# Patient Record
Sex: FEMALE | Race: WHITE
[De-identification: names, ages, dates, MRNs, and addresses within clinical notes are randomized per-mention and may not be internally consistent; named-entity substitution may affect disease eponyms.]

---

## 2020-09-15 ENCOUNTER — HOSPITAL ENCOUNTER (EMERGENCY)
Dept: HOSPITAL 76 - ED | Age: 55
Discharge: HOME | End: 2020-09-15
Payer: COMMERCIAL

## 2020-09-15 VITALS — SYSTOLIC BLOOD PRESSURE: 136 MMHG | DIASTOLIC BLOOD PRESSURE: 78 MMHG
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
## 2020-09-15 DIAGNOSIS — S92.211A: Primary | ICD-10-CM

## 2020-09-15 PROCEDURE — 99283 EMERGENCY DEPT VISIT LOW MDM: CPT

## 2020-09-15 PROCEDURE — 73630 X-RAY EXAM OF FOOT: CPT

## 2020-09-15 PROCEDURE — 73610 X-RAY EXAM OF ANKLE: CPT

## 2020-09-15 NOTE — ED PHYSICIAN DOCUMENTATION
History of Present Illness





- Stated complaint


Stated Complaint: R FOOT INJ





- Chief complaint


Chief Complaint: Trauma Ext





- History obtained from


History obtained from: Patient





- History of Present Illness


Timing: How many days ago (3)





- Additonal information


Additional information: 


55-year-old female presents to the emergency department with acute right foot 

and ankle pain.  Reports descending stairs 3 days ago missing the last 2 stairs 

and falling forward onto her foot.  She had immediate and extensive swelling of 

the foot and ankle both medially and bilaterally.  She has not been able to bear

any weight and has been using home crutches.  She has iced the foot continuously

now for over 36 hours secondary to pain and bruising. No history of previous 

injury to the foot though she is on steroids chronically due to a history of 

adrenal insufficiency








Review of Systems


Constitutional: reports: Reviewed and negative


Eyes: reports: Reviewed and negative


Ears: reports: Reviewed and negative


Nose: reports: Reviewed and negative


Cardiac: reports: Reviewed and negative


Respiratory: reports: Reviewed and negative


GI: reports: Reviewed and negative


: reports: Reviewed and negative


Skin: reports: Other (Dense of bruising to the right foot and ankle)


Musculoskeletal: reports: Extremity pain, Joint pain, Extremity swelling, Joint 

swelling


Neurologic: reports: Reviewed and negative


Psychiatric: reports: Reviewed and negative


Endocrine: reports: Other (adrenal insufficiency)





PD PAST MEDICAL HISTORY





- Past Medical History


Past Medical History: Yes


Cardiovascular: None


Respiratory: None


Neuro: None


Endocrine/Autoimmune: None


GI: None


GYN: None


: None


HEENT: None


Psych: None


Musculoskeletal: None


Derm: None





- Past Surgical History


Past Surgical History: No


Ortho: Rotator cuff repair


/GYN:  section





- Present Medications


Home Medications: 


                                Ambulatory Orders











 Medication  Instructions  Recorded  Confirmed


 


Hydrocodone/Acetaminophen [Norco 1 each PO BID PRN #15 tablet 09/15/20 





5-325 Tablet]   














- Allergies


Allergies/Adverse Reactions: 


                                    Allergies











Allergy/AdvReac Type Severity Reaction Status Date / Time


 


shellfish derived Allergy  Unknown Verified 09/15/20 13:01














- Social History


Does the pt smoke?: No


Smoking Status: Never smoker





- Immunizations


Immunizations are current?: Yes





- POLST


Patient has POLST: No





PD ED PE EXPANDED





- General


General: Alert, No acute distress, Well developed/nourished





- Extremities


Extremities: Right ankle (swelling lateral and medial malleolous;  normal dorsi 

and plant frlexion.  Full ROM of right ankle), Right foot (swelling and 

ecchymosis dorsum of right food over the 2,3,4,5th metatarsals.  tenderness with

palpation proximal 4/5th metatarsals)





Results





- Vitals


Vitals: 


                               Vital Signs - 24 hr











  09/15/20





  12:54


 


Temperature 36.6 C


 


Heart Rate 61


 


Respiratory 14





Rate 


 


Blood Pressure 108/73


 


O2 Saturation 99








                                     Oxygen











O2 Source                      Room air

















- Rads (name of study)


  ** right ankle/foot


Radiology: Final report received (Avulsion injury Involving the lateral aspect o

f the calcaneal cuboid joint with lateral soft tissue swelling)





Departure





- Departure


Disposition: 01 Home, Self Care


Clinical Impression: 


Ankle fracture, right


Qualifiers:


 Encounter type: initial encounter Fracture type: closed Qualified Code(s): 

S82.891A - Other fracture of right lower leg, initial encounter for closed 

fracture





Condition: Stable


Record reviewed to determine appropriate education?: Yes


Follow-Up: 


Sancho Chaidez MD [Provider Admit Priv/Credential] - 


Prescriptions: 


Hydrocodone/Acetaminophen [Norco 5-325 Tablet] 1 each PO BID PRN #15 tablet


 PRN Reason: Pain


Comments: 


The x-ray shows that you have an avulsion fracture of the calcaneal cuboid joint

on your right foot.  The splint that we have placed you in is temporary.  Please

call the orthopedics department to schedule follow-up tomorrow.  If at any point

you have numbness or tingling in the foot feel that the splint is too tight or 

develop fevers please return to the emergency department to be reevaluated.  

Continue to be nonweightbearing on the ankle until seen by orthopedics therefore

use your crutches at all times





I have prescribed a limited amount of Vicodin for severe pain do not drive if 

taking it.  I do recommend Tylenol or ibuprofen for pain control otherwise

## 2020-09-15 NOTE — XRAY REPORT
PROCEDURE:  Ankle 3 View RT

 

INDICATIONS:  pain; r/o fx

 

TECHNIQUE:  3 views of the ankle were acquired.  

 

COMPARISON:  None

 

FINDINGS:  

 

Bones: Suggestion of a remote injury involving lateral and dorsal aspect of calcaneocuboid joint is s
een. No other fracture or dislocation. Ankle mortise is normally aligned.  No suspicious bony lesions
.  

 

Soft tissues:  No tibiotalar joint effusion.  Achilles tendon appears normal.  Mild lateral ankle sof
t tissue swelling is seen.

 

IMPRESSION: Acute avulsion injury involving lateral and dorsal aspect of proximal cuboid adjacent to 
the calcaneocuboid joint with lateral ankle soft tissue swelling.

 

Reviewed by: Aaron Al MD on 9/15/2020 2:21 PM PDT

Approved by: Aaron Al MD on 9/15/2020 2:21 PM PDT

 

 

Station ID:  [id number]

## 2020-09-15 NOTE — XRAY REPORT
PROCEDURE:  Foot 3 View RT

 

INDICATIONS:  fall r/o fracture

 

TECHNIQUE:  3 views of the foot were acquired.  

 

COMPARISON:  None

 

FINDINGS:  

 

Bones: Subtle cortical irregularity involving lateral aspect of anterior calcaneus/proximal cuboid janeth
ne is seen suggestive of avulsion injury in this area. Mild lateral ankle and hindfoot soft tissue sw
elling is also seen. No suspicious bony lesions.  

 

Soft tissues:  No tibiotalar joint effusion.  Achilles tendon appears normal.  

 

IMPRESSION:  

Suggestion of acute avulsion injury involving lateral aspect of calcaneocuboid joint with lateral sof
t tissues swelling.

 

Reviewed by: Aaron Al MD on 9/15/2020 2:22 PM PDT

Approved by: Aaron Al MD on 9/15/2020 2:22 PM PDT

 

 

Station ID:  [id number]

## 2020-11-06 ENCOUNTER — HOSPITAL ENCOUNTER (OUTPATIENT)
Dept: HOSPITAL 76 - DI.WCP | Age: 55
Discharge: HOME | End: 2020-11-06
Attending: PHYSICIAN ASSISTANT
Payer: COMMERCIAL

## 2020-11-06 DIAGNOSIS — S92.214D: Primary | ICD-10-CM

## 2020-11-06 NOTE — XRAY REPORT
PROCEDURE:  Foot 3 View RT

 

INDICATIONS:  RIGHT FOOT FRACTURE

 

TECHNIQUE:  3 nonweightbearing views of the foot were acquired.  

 

COMPARISON:  Right foot radiographs dated 9/15/2020

 

FINDINGS:  

 

Bones: Small osseous fragment is seen adjacent to lateral aspect of the cuboid at the level of the ca
lcaneocuboid articulation. Stable mild osseous irregularity at the dorsal aspect of the talar head. F
indings do not appear significantly changed when compared to the radiographs from 9/15/2020. No suspi
cious bony lesions.  

 

Soft tissues: Soft tissue edema is seen surrounding the ankle.

 

IMPRESSION:  

Tiny osseous fragment adjacent to the calcaneocuboid joint may represent an avulsion fragment, not si
gnificantly changed in appearance when compared to the radiographs from 9/15/2020. There is also inta
ct osseous irregularity at the dorsal aspect of the talar head.

 

Reviewed by: Nuno Gates MD on 11/6/2020 4:18 PM PST

Approved by: Nuno Gates MD on 11/6/2020 4:18 PM PST

 

 

Station ID:  [id number]

## 2021-07-27 ENCOUNTER — HOSPITAL ENCOUNTER (OUTPATIENT)
Dept: HOSPITAL 76 - DI.N | Age: 56
Discharge: HOME | End: 2021-07-27
Attending: PHYSICIAN ASSISTANT
Payer: COMMERCIAL

## 2021-07-27 DIAGNOSIS — M79.671: Primary | ICD-10-CM

## 2021-07-27 NOTE — XRAY REPORT
PROCEDURE:  Foot 3 View RT

 

INDICATIONS:  R FOOT PX

 

TECHNIQUE:  3 views of the right foot were acquired.  

 

COMPARISON:  11/6/2020

 

FINDINGS:  

 

Bones:  No fractures or dislocations. Small osseous fragment adjacent to the lateral margin of the cu
boid bone is identified 11/6/2020 is not seen on the current study. No suspicious bony lesions.  

 

Soft tissues:  No tibiotalar joint effusion.  Achilles tendon appears normal.  

 

 

IMPRESSION:  

 

No fracture. No osseous lesion. If there are persistent symptoms or continued clinical concern for pa
thology, then repeat plain film radiographs (7-10 days) or advanced imaging (CT, MR, bone scan) shoul
d be considered for further evaluation.

 

Reviewed by: Sahra Jones MD, PhD on 7/27/2021 5:01 PM PDT

Approved by: Sahra Jones MD, PhD on 7/27/2021 5:01 PM PDT

 

 

Station ID:  [id number]

## 2021-08-26 ENCOUNTER — HOSPITAL ENCOUNTER (OUTPATIENT)
Dept: HOSPITAL 76 - LAB.S | Age: 56
Discharge: HOME | End: 2021-08-26
Attending: INTERNAL MEDICINE
Payer: COMMERCIAL

## 2021-08-26 DIAGNOSIS — E27.49: Primary | ICD-10-CM

## 2021-08-26 LAB
ALBUMIN DIAFP-MCNC: 3.8 G/DL (ref 3.2–5.5)
ALBUMIN/GLOB SERPL: 1.5 {RATIO} (ref 1–2.2)
ALP SERPL-CCNC: 49 IU/L (ref 42–121)
ALT SERPL W P-5'-P-CCNC: 21 IU/L (ref 10–60)
ANION GAP SERPL CALCULATED.4IONS-SCNC: 6 MMOL/L (ref 6–13)
AST SERPL W P-5'-P-CCNC: 17 IU/L (ref 10–42)
BILIRUB BLD-MCNC: 0.5 MG/DL (ref 0.2–1)
BUN SERPL-MCNC: 29 MG/DL (ref 6–20)
CALCIUM UR-MCNC: 9.4 MG/DL (ref 8.5–10.3)
CHLORIDE SERPL-SCNC: 103 MMOL/L (ref 101–111)
CO2 SERPL-SCNC: 30 MMOL/L (ref 21–32)
CREAT SERPLBLD-SCNC: 0.6 MG/DL (ref 0.4–1)
GFRSERPLBLD MDRD-ARVRAT: 103 ML/MIN/{1.73_M2} (ref 89–?)
GLOBULIN SER-MCNC: 2.5 G/DL (ref 2.1–4.2)
GLUCOSE SERPL-MCNC: 85 MG/DL (ref 70–100)
POTASSIUM SERPL-SCNC: 4.7 MMOL/L (ref 3.5–5)
PROT SPEC-MCNC: 6.3 G/DL (ref 6.7–8.2)
SODIUM SERPLBLD-SCNC: 139 MMOL/L (ref 135–145)

## 2021-08-26 PROCEDURE — 36415 COLL VENOUS BLD VENIPUNCTURE: CPT

## 2021-08-26 PROCEDURE — 80053 COMPREHEN METABOLIC PANEL: CPT

## 2021-10-20 ENCOUNTER — HOSPITAL ENCOUNTER (EMERGENCY)
Dept: HOSPITAL 76 - ED | Age: 56
Discharge: HOME | End: 2021-10-20
Payer: COMMERCIAL

## 2021-10-20 VITALS — SYSTOLIC BLOOD PRESSURE: 116 MMHG | DIASTOLIC BLOOD PRESSURE: 70 MMHG

## 2021-10-20 DIAGNOSIS — S81.812A: Primary | ICD-10-CM
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
## 2021-10-20 PROCEDURE — 73590 X-RAY EXAM OF LOWER LEG: CPT

## 2021-10-20 PROCEDURE — 12004 RPR S/N/AX/GEN/TRK7.6-12.5CM: CPT

## 2021-10-20 PROCEDURE — 96372 THER/PROPH/DIAG INJ SC/IM: CPT

## 2021-10-20 PROCEDURE — 99283 EMERGENCY DEPT VISIT LOW MDM: CPT

## 2021-10-20 NOTE — XRAY REPORT
PROCEDURE:  Tib/Fib LT

 

INDICATIONS:  struck left lower leg

 

TECHNIQUE:  2 views of the tibia and fibula were acquired.  

 

COMPARISON:  None.

 

 

FINDINGS:

BONES: No acute, displaced fracture or dislocation.

 

SOFT TISSUES: Defect overlying the mid to distal fibula, which may reflect laceration. No radiopaque 
foreign body.

 

IMPRESSION:  

1.No acute osseous abnormality.

 

   

 

 

Reviewed by: Mike Rainey MD on 10/20/2021 9:10 AM PDT

Approved by: Mike Rainey MD on 10/20/2021 9:10 AM PDT

 

 

Station ID:  [id number]

## 2021-10-20 NOTE — ED PHYSICIAN DOCUMENTATION
PD HPI LOWER EXT INJURY





- Stated complaint


Stated Complaint: LT LEG INJURY





- Chief complaint


Chief Complaint: Laceration





- History obtained from


History obtained from: Patient





- History of Present Illness


PD HPI LOW EXT INJURY LOCATION: Left


Type of injury: Fall (she was getting ready to go to airport for business trip 

and tripped over her suitcase getting out to car, with striking shin as fell. 

Was wearing pants but got skin tear/lac through the clothing, from blunt edge. 

Large flap laceration front of left lower leg.)


Where injury occurred: Home


Timing - onset: How many hours ago (1), Today


Timing - details: Abrupt onset, Still present


Worsened by: Moving, Palpating, Other (she cleansed and apllied bandages to the 

laceration.)


Associated symptoms: No: Weakness, Numbness


Similar symptoms before: Has not had sx before, Other (is on chronic steroids 

due to Addisons disease and states skin bruises easily and slow healing.)


Recently seen: Not recently seen





Review of Systems


Constitutional: denies: Fever, Chills


Cardiac: denies: Chest pain / pressure


GI: denies: Abdominal Pain


Skin: reports: Laceration (s)


Neurologic: denies: Focal weakness, Numbness, Altered mental status, Headache





PD PAST MEDICAL HISTORY





- Past Medical History


Cardiovascular: None


Respiratory: None


Neuro: None


Endocrine/Autoimmune: None


GI: None


GYN: None


: None


HEENT: None


Psych: None


Musculoskeletal: None


Derm: None





- Past Surgical History


Past Surgical History: No


Ortho: Rotator cuff repair


/GYN:  section





- Present Medications


Home Medications: 


                                Ambulatory Orders











 Medication  Instructions  Recorded  Confirmed


 


Hydrocodone/Acetaminophen [Norco 1 each PO BID PRN #15 tablet 09/15/20 





5-325 Tablet]   


 


HYDROcod/ACETAM 5/325 [Norco 5/325] 1 ea PO Q6H PRN #18 tablet 10/20/21 


 


cephALEXin [Keflex] 500 mg PO TID #20 cap 10/20/21 














- Allergies


Allergies/Adverse Reactions: 


                                    Allergies











Allergy/AdvReac Type Severity Reaction Status Date / Time


 


shellfish derived Allergy  Unknown Verified 10/20/21 08:11














- Social History


Does the pt smoke?: No


Smoking Status: Never smoker





- Immunizations


Immunizations are current?: Yes





- POLST


Patient has POLST: No





PD ED PE NORMAL





- Vitals


Vital signs reviewed: Yes





- General


General: Alert and oriented X 3, Well developed/nourished, Other (appears in 

pain due to lower leg, and limping gait, but able to put full weight on leg. )





- Neck


Neck: Supple, no meningeal sign, No bony TTP





- Cardiac


Cardiac: RRR, No murmur





- Respiratory


Respiratory: Clear bilaterally





- Abdomen


Abdomen: Soft, Non tender





- Derm


Derm: Normal color, Warm and dry





- Extremities


Extremities: No deformity (tender anterior lower leg without deformity. FLap lac

 with length 10 cm. ), Other (left anterior lower leg has U-shaped flap lac with

 connected portion inferior. The upper portion has the tibia almost exposed with

 just some fatty layer just over it. The lacerated flap is full thickness skin 

so seem viable base, though the skin layer is thin. no FB. Minimal bleeding in 

base.  )





- Neuro


Neuro: Alert and oriented X 3, No motor deficit, No sensory deficit, Normal 

speech


Eye Opening: Spontaneous


Motor: Obeys Commands


Verbal: Oriented


GCS Score: 15





Results





- Vitals


Vitals: 


                               Vital Signs - 24 hr











  10/20/21





  08:08


 


Temperature 36.1 C L


 


Heart Rate 101 H


 


Respiratory 16





Rate 


 


Blood Pressure 116/70


 


O2 Saturation 100








                                     Oxygen











O2 Source                      Room air

















- Rads (name of study)


  ** left tib/fib


Radiology: Prelim report reviewed (no fractures nor osseous abnormality.), See 

rad report





Procedures





- Laceration (location)


  ** left anterior lower leg


Length in cm: 10 (skin at superior part is the thinnest, but still just full 

thickness. )


Wound type: Flap, Into subcut fat


Neurovascular status: Sensory intact, Motor intact, Vascular intact


Anesthesia: Lidocaine 1% with epi, Marcaine 0.5%


Wound preparation: Irrigated copiously NS, Wound explored, To the base, 

debridement of wound edges (traumatic laceration/avulsion)


Skin layer closure: Steri strips, Running, Size #-0 - enter number (4), Sutures 

- enter # (many)


Other: Patient tolerated well, No complications, Neurovascular intact, Dressing 

applied, Tetanus UTD





PD MEDICAL DECISION MAKING





- ED course


Complexity details: reviewed results (no fractures), considered differential 

(Large flap laceration to fatty tissue layer so likely viable tissue, though 

fragile skin due to steroids. Concern for slow healing, and suggest tight follow

 up with PMD and possible wound care clinic. ), d/w patient





Departure





- Departure


Disposition: 01 Home, Self Care


Clinical Impression: 


Laceration of lower leg


Qualifiers:


 Encounter type: initial encounter Laterality: left Qualified Code(s): S81.812A 

- Laceration without foreign body, left lower leg, initial encounter





Condition: Stable


Record reviewed to determine appropriate education?: Yes


Instructions:  ED Laceration All


Follow-Up: 


Tra Busch MD [Primary Care Provider] - 


Prescriptions: 


cephALEXin [Keflex] 500 mg PO TID #20 cap


HYDROcod/ACETAM 5/325 [Norco 5/325] 1 ea PO Q6H PRN #18 tablet


 PRN Reason: Pain


Comments: 


Keep the Steri-Strips and sutures clean and dry.  Change the dressing over them 

once or twice daily.  Allow the Steri-Strips to fall off on their own after 

hopefully week or so.





At that point you can start using ointment gently to the wound.  The sutures 

want to be removed in about 10 to 14 days to give a longer time for healing.





It would be good to have the wound checked again after several days (when back 

from your trip/early next week).  Contact your primary care and see if they will

see you or otherwise he can have the wound checked at the walk-in clinic on the 

[address] or back here in the ER.





Crutches for partial weight bearing to reduce the skin movement and tension 

initially while healing. Brief walking is okay (bathroom and back, etc).





This will take several weeks and healing.  Your primary care may want to refer 

you to the wound care clinic here at the hospital.  He would have to phone it in

all order to it in order for that to happen.





Alternatively the phone number to the wound care clinic is, and you can call 

them directly to see what is required in order to get follow-up there.





Cephalexin antibiotic 3 times a day for a week to reduce the chance of 

infection.  Tylenol every 4-6 hours if needed for pain or hydrocodone if needed 

for worse pain.





I phoned prescriptions to [facility] pharmacy in [place].





I am prescribing a short course of narcotic pain medication for you.  These are 

potentially dangerous and addictive medications that should be used carefully.


These medications may constipate you.  Take an over-the-counter stool softener 

such as docusate twice daily with plenty of water while taking these 

medications.  If you go 24 hours without a bowel movement, take over-the-counter

MiraLAX, per package instructions.


Do not drink or drive while taking these medications.


If you received narcotic or sedating medications while in the emergency 

department do not drive for 24 hours.  Store this medication in a safe, secure 

place and out of reach of children.


It is a violation of federal law to give or sell this medication to another 

person or to use in a manner other than prescribed.


The ED will not refill narcotic prescriptions, including prescriptions lost or 

stolen.  You can dispose of unwanted medications at the [facility]'s office 

or at several pharmacies such as [facility].


Discharge Date/Time: 10/20/21 10:45

## 2021-12-29 ENCOUNTER — HOSPITAL ENCOUNTER (OUTPATIENT)
Dept: HOSPITAL 76 - DI.S | Age: 56
Discharge: HOME | End: 2021-12-29
Attending: INTERNAL MEDICINE
Payer: COMMERCIAL

## 2021-12-29 DIAGNOSIS — L97.822: Primary | ICD-10-CM

## 2021-12-29 NOTE — XRAY REPORT
PROCEDURE:  Tib/Fib LT

 

INDICATIONS:  SKIN ULCER,LEFT PRETIBIAL REGION W/ FAT LAYER EXPO

 

TECHNIQUE:  2 views of the tibia and fibula were acquired.  

 

COMPARISON:  X-ray tib-fib 10/20/2021

 

FINDINGS:  

 

Bones:  No fractures or dislocations.  No suspicious bony lesions.  

 

Soft tissues:  No suspicious soft tissue calcifications or masses.  There is appearance of overlying 
bandage at the mid anterior tibial region. Slight irregularity of the skin surface is noted, partiall
y visualized by overlying bandage.

 

IMPRESSION:  

Slight irregularity of the skin surface likely corresponding to area of ulcer in the anterior tibial 
region. No underlying osseous abnormality.

 

Reviewed by: Karina Rincon MD on 12/29/2021 12:06 PM PST

Approved by: Karina Rincon MD on 12/29/2021 12:06 PM PST

 

 

Station ID:  [id number]

## 2022-07-02 ENCOUNTER — HOSPITAL ENCOUNTER (OUTPATIENT)
Dept: HOSPITAL 76 - EMS | Age: 57
Discharge: LEFT BEFORE DISCHARGE | End: 2022-07-02
Payer: COMMERCIAL

## 2022-07-02 DIAGNOSIS — R53.1: ICD-10-CM

## 2022-07-02 DIAGNOSIS — R25.1: Primary | ICD-10-CM

## 2022-07-02 DIAGNOSIS — E27.1: ICD-10-CM

## 2022-09-09 ENCOUNTER — HOSPITAL ENCOUNTER (OUTPATIENT)
Dept: HOSPITAL 76 - DI.S | Age: 57
Discharge: HOME | End: 2022-09-09
Attending: PHYSICIAN ASSISTANT
Payer: COMMERCIAL

## 2022-09-09 DIAGNOSIS — M25.532: Primary | ICD-10-CM

## 2022-09-09 NOTE — XRAY REPORT
PROCEDURE:  Wrist 4 View LT

 

INDICATIONS: LEFT WRIST PAIN

 

TECHNIQUE:  4 views of the wrist were acquired.  

 

COMPARISON:  None

 

FINDINGS:  

 

Bones:  No fractures or dislocations.  No suspicious bony lesions.  

 

Scaphoid view:  Scaphoid appears intact. Scapholunate interval is maintained.

 

Soft tissues:  No suspicious soft tissue calcifications.  

 

IMPRESSION:  

Left wrist without acute fracture or dislocation.

 

If there is continued clinical concern for pathology or occult fracture, consider follow-up imaging w
ith repeat radiographs in 10-14 days and possible advanced imaging (CT, MRI, bone scan) if symptoms p
ersist.

 

Reviewed by: Rick Byrd MD on 9/9/2022 9:33 PM PDT

Approved by: Rick Byrd MD on 9/9/2022 9:33 PM PDT

 

 

Station ID:  [id number]

## 2024-01-24 ENCOUNTER — APPOINTMENT (RX ONLY)
Dept: URBAN - METROPOLITAN AREA CLINIC 15 | Facility: CLINIC | Age: 59
Setting detail: DERMATOLOGY
End: 2024-01-24

## 2024-01-24 DIAGNOSIS — L57.8 OTHER SKIN CHANGES DUE TO CHRONIC EXPOSURE TO NONIONIZING RADIATION: ICD-10-CM

## 2024-01-24 DIAGNOSIS — Z71.89 OTHER SPECIFIED COUNSELING: ICD-10-CM

## 2024-01-24 DIAGNOSIS — D18.0 HEMANGIOMA: ICD-10-CM

## 2024-01-24 DIAGNOSIS — L82.1 OTHER SEBORRHEIC KERATOSIS: ICD-10-CM

## 2024-01-24 DIAGNOSIS — D22 MELANOCYTIC NEVI: ICD-10-CM

## 2024-01-24 DIAGNOSIS — L56.5 DISSEMINATED SUPERFICIAL ACTINIC POROKERATOSIS (DSAP): ICD-10-CM

## 2024-01-24 DIAGNOSIS — E27 OTHER DISORDERS OF ADRENAL GLAND: ICD-10-CM

## 2024-01-24 DIAGNOSIS — L81.4 OTHER MELANIN HYPERPIGMENTATION: ICD-10-CM

## 2024-01-24 DIAGNOSIS — I73.00 RAYNAUD'S SYNDROME WITHOUT GANGRENE: ICD-10-CM

## 2024-01-24 PROBLEM — D18.01 HEMANGIOMA OF SKIN AND SUBCUTANEOUS TISSUE: Status: ACTIVE | Noted: 2024-01-24

## 2024-01-24 PROBLEM — D22.71 MELANOCYTIC NEVI OF RIGHT LOWER LIMB, INCLUDING HIP: Status: ACTIVE | Noted: 2024-01-24

## 2024-01-24 PROBLEM — D22.62 MELANOCYTIC NEVI OF LEFT UPPER LIMB, INCLUDING SHOULDER: Status: ACTIVE | Noted: 2024-01-24

## 2024-01-24 PROBLEM — D22.72 MELANOCYTIC NEVI OF LEFT LOWER LIMB, INCLUDING HIP: Status: ACTIVE | Noted: 2024-01-24

## 2024-01-24 PROBLEM — E27.40 UNSPECIFIED ADRENOCORTICAL INSUFFICIENCY: Status: ACTIVE | Noted: 2024-01-24

## 2024-01-24 PROBLEM — D22.5 MELANOCYTIC NEVI OF TRUNK: Status: ACTIVE | Noted: 2024-01-24

## 2024-01-24 PROBLEM — D22.61 MELANOCYTIC NEVI OF RIGHT UPPER LIMB, INCLUDING SHOULDER: Status: ACTIVE | Noted: 2024-01-24

## 2024-01-24 PROCEDURE — 99203 OFFICE O/P NEW LOW 30 MIN: CPT

## 2024-01-24 PROCEDURE — ? ADDITIONAL NOTES

## 2024-01-24 PROCEDURE — ? COUNSELING

## 2024-01-24 ASSESSMENT — LOCATION SIMPLE DESCRIPTION DERM
LOCATION SIMPLE: RIGHT CHEEK
LOCATION SIMPLE: LEFT CALF
LOCATION SIMPLE: LEFT POSTERIOR THIGH
LOCATION SIMPLE: LEFT CHEEK
LOCATION SIMPLE: RIGHT FOREARM
LOCATION SIMPLE: RIGHT UPPER ARM
LOCATION SIMPLE: RIGHT CALF
LOCATION SIMPLE: LEFT MIDDLE FINGER
LOCATION SIMPLE: RIGHT LOWER BACK
LOCATION SIMPLE: RIGHT THIGH
LOCATION SIMPLE: LEFT UPPER BACK
LOCATION SIMPLE: RIGHT UPPER BACK
LOCATION SIMPLE: CHEST
LOCATION SIMPLE: LEFT FOREARM
LOCATION SIMPLE: LEFT UPPER ARM
LOCATION SIMPLE: LEFT PRETIBIAL REGION
LOCATION SIMPLE: RIGHT PRETIBIAL REGION
LOCATION SIMPLE: LEFT THIGH
LOCATION SIMPLE: RIGHT MIDDLE FINGER
LOCATION SIMPLE: RIGHT POSTERIOR THIGH

## 2024-01-24 ASSESSMENT — LOCATION DETAILED DESCRIPTION DERM
LOCATION DETAILED: LEFT INFERIOR UPPER BACK
LOCATION DETAILED: RIGHT SUPERIOR LATERAL MIDBACK
LOCATION DETAILED: RIGHT DISTAL POSTERIOR THIGH
LOCATION DETAILED: RIGHT VENTRAL PROXIMAL FOREARM
LOCATION DETAILED: LEFT ANTERIOR PROXIMAL THIGH
LOCATION DETAILED: LEFT PROXIMAL DORSAL MIDDLE FINGER
LOCATION DETAILED: LEFT VENTRAL DISTAL FOREARM
LOCATION DETAILED: RIGHT ANTERIOR PROXIMAL THIGH
LOCATION DETAILED: LEFT DISTAL PRETIBIAL REGION
LOCATION DETAILED: RIGHT CENTRAL MALAR CHEEK
LOCATION DETAILED: RIGHT PROXIMAL CALF
LOCATION DETAILED: LEFT CENTRAL MALAR CHEEK
LOCATION DETAILED: RIGHT MEDIAL INFERIOR CHEST
LOCATION DETAILED: RIGHT MID-UPPER BACK
LOCATION DETAILED: RIGHT DISTAL PRETIBIAL REGION
LOCATION DETAILED: RIGHT PROXIMAL DORSAL FOREARM
LOCATION DETAILED: LEFT ANTERIOR DISTAL UPPER ARM
LOCATION DETAILED: RIGHT VENTRAL DISTAL FOREARM
LOCATION DETAILED: RIGHT ANTERIOR DISTAL UPPER ARM
LOCATION DETAILED: LEFT PROXIMAL DORSAL FOREARM
LOCATION DETAILED: RIGHT PROXIMAL DORSAL MIDDLE FINGER
LOCATION DETAILED: LEFT DISTAL POSTERIOR THIGH
LOCATION DETAILED: LEFT PROXIMAL CALF

## 2024-01-24 ASSESSMENT — LOCATION ZONE DERM
LOCATION ZONE: TRUNK
LOCATION ZONE: FACE
LOCATION ZONE: FINGER
LOCATION ZONE: ARM
LOCATION ZONE: LEG

## 2024-01-24 NOTE — PROCEDURE: ADDITIONAL NOTES
Render Risk Assessment In Note?: no
Additional Notes: Recommended sleeping in cotton gloves with thick moisturizer
Detail Level: Simple
Additional Notes: Pt has compound from [facility] [employer]; has statin/cholesterol compound crm. she uses it bid prn\\nOffered refill if needed
Additional Notes: Pt complains of frequent injury\\nRecommended keeping lesions moist and using covering
Additional Notes: Pt asked about getting collagen injections, gave okay from a health standpoint

## 2024-01-25 ENCOUNTER — RX ONLY (OUTPATIENT)
Age: 59
Setting detail: RX ONLY
End: 2024-01-25

## 2024-05-17 ENCOUNTER — HOSPITAL ENCOUNTER (OUTPATIENT)
Dept: RADIOLOGY | Facility: MEDICAL CENTER | Age: 59
End: 2024-05-17
Attending: FAMILY MEDICINE
Payer: COMMERCIAL

## 2024-05-17 DIAGNOSIS — M79.661 PAIN IN RIGHT SHIN: ICD-10-CM

## 2024-05-23 ENCOUNTER — APPOINTMENT (RX ONLY)
Dept: URBAN - METROPOLITAN AREA CLINIC 15 | Facility: CLINIC | Age: 59
Setting detail: DERMATOLOGY
End: 2024-05-23

## 2024-05-23 DIAGNOSIS — Z41.9 ENCOUNTER FOR PROCEDURE FOR PURPOSES OTHER THAN REMEDYING HEALTH STATE, UNSPECIFIED: ICD-10-CM

## 2024-05-23 PROCEDURE — ? BOTOX

## 2024-05-23 NOTE — HPI: COSMETIC (BOTOX)
Have You Had Botox Before?: has had botox
When Was Your Last Botox Treatment?: November 2023 in [place]

## 2024-05-23 NOTE — PROCEDURE: BOTOX
Lcl Root Units: 0
Show Lcl Units: No
Masseter Units: 20
Show Levator Superior Units: Yes
Post-Care Instructions: Patient instructed to not lie down for 4 hours and limit physical activity for 24 hours.
Reconstitution Date (Optional): 5/23/24
Additional Area 1 Location: [place]
Incrementing Botox Units: By 0.5 Units
Comments: Pt tolerated well,no issues. She will call for 3 mo Botox visit.
Nasal Root Units: 2
Expiration Date (Month Year): 09/2026
Detail Level: Detailed
Price (Use Numbers Only, No Special Characters Or $): 144
Lot #: [id number]
Forehead Units: 8
Dilution (U/0.1 Cc): 5
Consent: Written consent obtained. Risks include but not limited to lid/brow ptosis, bruising, swelling, diplopia, temporary effect, incomplete chemical denervation.

## 2024-06-20 ENCOUNTER — APPOINTMENT (RX ONLY)
Dept: URBAN - METROPOLITAN AREA CLINIC 15 | Facility: CLINIC | Age: 59
Setting detail: DERMATOLOGY
End: 2024-06-20

## 2024-06-20 DIAGNOSIS — Z41.9 ENCOUNTER FOR PROCEDURE FOR PURPOSES OTHER THAN REMEDYING HEALTH STATE, UNSPECIFIED: ICD-10-CM

## 2024-06-20 PROCEDURE — ? SKINVIVE INJECTION

## 2024-06-20 NOTE — PROCEDURE: SKINVIVE INJECTION
Map Statment: See Attach Map for Complete Details
Marionette Lines Filler Volume In Cc: 0
Number Of Syringes (Required For Inventory): 1
Price (Use Numbers Only, No Special Characters Or $): 548
Post-Care Instructions: Patient instructed to apply ice to reduce swelling.
Include Cannula Information In Note?: No
Consent: Written consent obtained. Risks include but not limited to bruising, beading, irregular texture, ulceration, infection, allergic reaction, scar formation, incomplete augmentation, temporary nature, procedural pain.
Expiration Date (Month Year): 8/19/24
Filler: Skinvive
Procedural Text: The filler was administered to the treatment areas noted above.
Detail Level: Detailed
Topical Anesthesia?: 15% lidocaine, 5% prilocaine, 0.25% phenenylephrine
Lot #: [id number]

## 2024-10-09 ENCOUNTER — APPOINTMENT (OUTPATIENT)
Dept: RADIOLOGY | Facility: MEDICAL CENTER | Age: 59
End: 2024-10-09
Attending: INTERNAL MEDICINE
Payer: COMMERCIAL

## 2024-10-17 ENCOUNTER — HOSPITAL ENCOUNTER (OUTPATIENT)
Dept: RADIOLOGY | Facility: MEDICAL CENTER | Age: 59
End: 2024-10-17
Payer: COMMERCIAL

## 2024-10-18 ENCOUNTER — HOSPITAL ENCOUNTER (OUTPATIENT)
Dept: RADIOLOGY | Facility: MEDICAL CENTER | Age: 59
End: 2024-10-18
Payer: COMMERCIAL

## 2024-10-29 ENCOUNTER — HOSPITAL ENCOUNTER (OUTPATIENT)
Dept: RADIOLOGY | Facility: MEDICAL CENTER | Age: 59
End: 2024-10-29
Attending: PHYSICAL MEDICINE & REHABILITATION
Payer: COMMERCIAL

## 2024-10-29 DIAGNOSIS — M47.816 LUMBAR SPONDYLOSIS: ICD-10-CM

## 2024-10-29 PROCEDURE — 72148 MRI LUMBAR SPINE W/O DYE: CPT

## 2024-11-07 ENCOUNTER — APPOINTMENT (RX ONLY)
Dept: URBAN - METROPOLITAN AREA CLINIC 15 | Facility: CLINIC | Age: 59
Setting detail: DERMATOLOGY
End: 2024-11-07

## 2024-11-07 DIAGNOSIS — Z41.9 ENCOUNTER FOR PROCEDURE FOR PURPOSES OTHER THAN REMEDYING HEALTH STATE, UNSPECIFIED: ICD-10-CM

## 2024-11-07 PROCEDURE — ? BOTOX

## 2024-11-07 NOTE — PROCEDURE: BOTOX
Additional Area 3 Units: 0
Show Additional Area 1: Yes
Show Right And Left Pupillary Line Units: No
Price (Use Numbers Only, No Special Characters Or $): 048
Detail Level: Detailed
Lot #: [id number]
Dilution (U/0.1 Cc): 5
Forehead Units: 8
Consent: Written consent obtained. Risks include but not limited to lid/brow ptosis, bruising, swelling, diplopia, temporary effect, incomplete chemical denervation.
Glabellar Complex Units: 15
Reconstitution Date (Optional): 11/7/24
Comments: Pt tolerated well,no issues. She will call for 3 mo Botox visit.
Post-Care Instructions: Patient instructed to not lie down for 4 hours and limit physical activity for 24 hours.
Periorbital Skin Units: 25
Nasal Root Units: 2
Expiration Date (Month Year): 09/2026
Incrementing Botox Units: By 0.5 Units

## 2024-11-13 ENCOUNTER — APPOINTMENT (OUTPATIENT)
Dept: RADIOLOGY | Facility: MEDICAL CENTER | Age: 59
End: 2024-11-13
Attending: INTERNAL MEDICINE
Payer: COMMERCIAL

## 2024-12-10 ENCOUNTER — APPOINTMENT (OUTPATIENT)
Dept: RADIOLOGY | Facility: MEDICAL CENTER | Age: 59
End: 2024-12-10
Attending: INTERNAL MEDICINE
Payer: COMMERCIAL

## 2024-12-10 DIAGNOSIS — Z12.31 VISIT FOR SCREENING MAMMOGRAM: ICD-10-CM

## 2024-12-10 PROCEDURE — 77067 SCR MAMMO BI INCL CAD: CPT

## 2024-12-12 ENCOUNTER — APPOINTMENT (OUTPATIENT)
Dept: URBAN - METROPOLITAN AREA CLINIC 15 | Facility: CLINIC | Age: 59
Setting detail: DERMATOLOGY
End: 2024-12-12

## 2024-12-12 DIAGNOSIS — Z41.9 ENCOUNTER FOR PROCEDURE FOR PURPOSES OTHER THAN REMEDYING HEALTH STATE, UNSPECIFIED: ICD-10-CM

## 2024-12-12 PROCEDURE — ? SKINVIVE INJECTION

## 2024-12-12 NOTE — PROCEDURE: SKINVIVE INJECTION
Lateral Face Filler Volume In Cc: 0
Consent: Written consent obtained. Risks include but not limited to bruising, beading, irregular texture, ulceration, infection, allergic reaction, scar formation, incomplete augmentation, temporary nature, procedural pain.
Expiration Date (Month Year): 8/19/25
Include Cannula Information In Note?: No
Detail Level: Detailed
Procedural Text: The filler was administered to the treatment areas noted above.
Price (Use Numbers Only, No Special Characters Or $): 526
Map Statment: See Attach Map for Complete Details
Lot #: [id number]
Filler: Skinvive
Number Of Syringes (Required For Inventory): 1
Topical Anesthesia?: 15% lidocaine, 5% prilocaine, 0.25% phenenylephrine
Post-Care Instructions: Patient instructed to apply ice to reduce swelling.

## 2024-12-15 ENCOUNTER — APPOINTMENT (OUTPATIENT)
Dept: RADIOLOGY | Facility: MEDICAL CENTER | Age: 59
End: 2024-12-15
Attending: FAMILY MEDICINE
Payer: COMMERCIAL

## 2025-01-20 ENCOUNTER — APPOINTMENT (OUTPATIENT)
Dept: RADIOLOGY | Facility: MEDICAL CENTER | Age: 60
End: 2025-01-20
Attending: FAMILY MEDICINE
Payer: COMMERCIAL

## 2025-02-06 ENCOUNTER — APPOINTMENT (OUTPATIENT)
Dept: URBAN - METROPOLITAN AREA CLINIC 15 | Facility: CLINIC | Age: 60
Setting detail: DERMATOLOGY
End: 2025-02-06

## 2025-02-06 DIAGNOSIS — Z41.9 ENCOUNTER FOR PROCEDURE FOR PURPOSES OTHER THAN REMEDYING HEALTH STATE, UNSPECIFIED: ICD-10-CM

## 2025-02-06 PROCEDURE — ? BOTOX

## 2025-02-06 NOTE — PROCEDURE: BOTOX
Additional Area 3 Units: 0
Show Additional Area 1: Yes
Show Right And Left Pupillary Line Units: No
Price (Use Numbers Only, No Special Characters Or $): 550
Detail Level: Detailed
Lot #: [id number]
Dilution (U/0.1 Cc): 5
Forehead Units: 8
Consent: Written consent obtained. Risks include but not limited to lid/brow ptosis, bruising, swelling, diplopia, temporary effect, incomplete chemical denervation.
Glabellar Complex Units: 15
Reconstitution Date (Optional): 2/5/25
Depressor Anguli Oris Units: 3
Comments: Pt tolerated well,no issues. She will call for 3 mo Botox visit.
Post-Care Instructions: Patient instructed to not lie down for 4 hours and limit physical activity for 24 hours.
Periorbital Skin Units: 22
Nasal Root Units: 2
Expiration Date (Month Year): 09/2026
Incrementing Botox Units: By 0.5 Units

## 2025-02-20 ENCOUNTER — APPOINTMENT (OUTPATIENT)
Dept: RADIOLOGY | Facility: MEDICAL CENTER | Age: 60
End: 2025-02-20
Attending: PHYSICIAN ASSISTANT
Payer: COMMERCIAL

## 2025-02-20 DIAGNOSIS — M25.511 PAIN OF RIGHT SHOULDER REGION: ICD-10-CM

## 2025-02-20 PROCEDURE — 73221 MRI JOINT UPR EXTREM W/O DYE: CPT | Mod: RT

## 2025-02-23 ENCOUNTER — HOSPITAL ENCOUNTER (OUTPATIENT)
Dept: RADIOLOGY | Facility: MEDICAL CENTER | Age: 60
End: 2025-02-23
Attending: FAMILY MEDICINE
Payer: COMMERCIAL

## 2025-02-23 ENCOUNTER — APPOINTMENT (OUTPATIENT)
Dept: RADIOLOGY | Facility: MEDICAL CENTER | Age: 60
End: 2025-02-23
Attending: EMERGENCY MEDICINE
Payer: COMMERCIAL

## 2025-02-23 ENCOUNTER — HOSPITAL ENCOUNTER (EMERGENCY)
Facility: MEDICAL CENTER | Age: 60
End: 2025-02-23
Attending: EMERGENCY MEDICINE
Payer: COMMERCIAL

## 2025-02-23 VITALS
HEIGHT: 67 IN | OXYGEN SATURATION: 96 % | BODY MASS INDEX: 16.92 KG/M2 | TEMPERATURE: 98.3 F | WEIGHT: 107.81 LBS | DIASTOLIC BLOOD PRESSURE: 53 MMHG | HEART RATE: 65 BPM | SYSTOLIC BLOOD PRESSURE: 91 MMHG | RESPIRATION RATE: 20 BRPM

## 2025-02-23 DIAGNOSIS — N28.1 ACQUIRED CYST OF KIDNEY: ICD-10-CM
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
## 2025-02-23 DIAGNOSIS — S70.01XA CONTUSION OF RIGHT HIP, INITIAL ENCOUNTER: ICD-10-CM

## 2025-02-23 DIAGNOSIS — K76.89 FLOATING LIVER: ICD-10-CM

## 2025-02-23 DIAGNOSIS — Z86.39 HISTORY OF ADDISON'S DISEASE: ICD-10-CM

## 2025-02-23 LAB
ALBUMIN SERPL BCP-MCNC: 4.2 G/DL (ref 3.2–4.9)
ALBUMIN/GLOB SERPL: 2.2 G/DL
ALP SERPL-CCNC: 47 U/L (ref 30–99)
ALT SERPL-CCNC: 19 U/L (ref 2–50)
ANION GAP SERPL CALC-SCNC: 8 MMOL/L (ref 7–16)
APPEARANCE UR: CLEAR
AST SERPL-CCNC: 24 U/L (ref 12–45)
BASOPHILS # BLD AUTO: 0.4 % (ref 0–1.8)
BASOPHILS # BLD: 0.03 K/UL (ref 0–0.12)
BILIRUB SERPL-MCNC: 0.4 MG/DL (ref 0.1–1.5)
BILIRUB UR QL STRIP.AUTO: NEGATIVE
BUN SERPL-MCNC: 18 MG/DL (ref 8–22)
CALCIUM ALBUM COR SERPL-MCNC: 8.9 MG/DL (ref 8.5–10.5)
CALCIUM SERPL-MCNC: 9.1 MG/DL (ref 8.4–10.2)
CHLORIDE SERPL-SCNC: 105 MMOL/L (ref 96–112)
CO2 SERPL-SCNC: 26 MMOL/L (ref 20–33)
COLOR UR: YELLOW
CREAT SERPL-MCNC: 0.67 MG/DL (ref 0.5–1.4)
EKG IMPRESSION: NORMAL
EOSINOPHIL # BLD AUTO: 0.09 K/UL (ref 0–0.51)
EOSINOPHIL NFR BLD: 1.1 % (ref 0–6.9)
ERYTHROCYTE [DISTWIDTH] IN BLOOD BY AUTOMATED COUNT: 49.9 FL (ref 35.9–50)
GFR SERPLBLD CREATININE-BSD FMLA CKD-EPI: 100 ML/MIN/1.73 M 2
GLOBULIN SER CALC-MCNC: 1.9 G/DL (ref 1.9–3.5)
GLUCOSE SERPL-MCNC: 75 MG/DL (ref 65–99)
GLUCOSE UR STRIP.AUTO-MCNC: NEGATIVE MG/DL
HCT VFR BLD AUTO: 41 % (ref 37–47)
HGB BLD-MCNC: 14.1 G/DL (ref 12–16)
IMM GRANULOCYTES # BLD AUTO: 0.02 K/UL (ref 0–0.11)
IMM GRANULOCYTES NFR BLD AUTO: 0.3 % (ref 0–0.9)
KETONES UR STRIP.AUTO-MCNC: NEGATIVE MG/DL
LEUKOCYTE ESTERASE UR QL STRIP.AUTO: NEGATIVE
LYMPHOCYTES # BLD AUTO: 3.11 K/UL (ref 1–4.8)
LYMPHOCYTES NFR BLD: 39.6 % (ref 22–41)
MCH RBC QN AUTO: 34.5 PG (ref 27–33)
MCHC RBC AUTO-ENTMCNC: 34.4 G/DL (ref 32.2–35.5)
MCV RBC AUTO: 100.2 FL (ref 81.4–97.8)
MICRO URNS: NORMAL
MONOCYTES # BLD AUTO: 0.6 K/UL (ref 0–0.85)
MONOCYTES NFR BLD AUTO: 7.6 % (ref 0–13.4)
NEUTROPHILS # BLD AUTO: 4 K/UL (ref 1.82–7.42)
NEUTROPHILS NFR BLD: 51 % (ref 44–72)
NITRITE UR QL STRIP.AUTO: NEGATIVE
NRBC # BLD AUTO: 0 K/UL
NRBC BLD-RTO: 0 /100 WBC (ref 0–0.2)
PH UR STRIP.AUTO: 5.5 [PH] (ref 5–8)
PLATELET # BLD AUTO: 218 K/UL (ref 164–446)
PMV BLD AUTO: 10.3 FL (ref 9–12.9)
POTASSIUM SERPL-SCNC: 4.6 MMOL/L (ref 3.6–5.5)
PROT SERPL-MCNC: 6.1 G/DL (ref 6–8.2)
PROT UR QL STRIP: NEGATIVE MG/DL
RBC # BLD AUTO: 4.09 M/UL (ref 4.2–5.4)
RBC UR QL AUTO: NEGATIVE
SODIUM SERPL-SCNC: 139 MMOL/L (ref 135–145)
SP GR UR STRIP.AUTO: 1.01
UROBILINOGEN UR STRIP.AUTO-MCNC: 0.2 EU/DL
WBC # BLD AUTO: 7.9 K/UL (ref 4.8–10.8)

## 2025-02-23 PROCEDURE — 700111 HCHG RX REV CODE 636 W/ 250 OVERRIDE (IP): Mod: JZ | Performed by: EMERGENCY MEDICINE

## 2025-02-23 PROCEDURE — 76700 US EXAM ABDOM COMPLETE: CPT

## 2025-02-23 PROCEDURE — 71045 X-RAY EXAM CHEST 1 VIEW: CPT

## 2025-02-23 PROCEDURE — 96375 TX/PRO/DX INJ NEW DRUG ADDON: CPT

## 2025-02-23 PROCEDURE — 80053 COMPREHEN METABOLIC PANEL: CPT

## 2025-02-23 PROCEDURE — 85025 COMPLETE CBC W/AUTO DIFF WBC: CPT

## 2025-02-23 PROCEDURE — 94760 N-INVAS EAR/PLS OXIMETRY 1: CPT

## 2025-02-23 PROCEDURE — 700105 HCHG RX REV CODE 258: Performed by: EMERGENCY MEDICINE

## 2025-02-23 PROCEDURE — 73502 X-RAY EXAM HIP UNI 2-3 VIEWS: CPT | Mod: RT

## 2025-02-23 PROCEDURE — 93005 ELECTROCARDIOGRAM TRACING: CPT | Mod: TC | Performed by: EMERGENCY MEDICINE

## 2025-02-23 PROCEDURE — 81003 URINALYSIS AUTO W/O SCOPE: CPT

## 2025-02-23 PROCEDURE — 96374 THER/PROPH/DIAG INJ IV PUSH: CPT

## 2025-02-23 PROCEDURE — 99285 EMERGENCY DEPT VISIT HI MDM: CPT

## 2025-02-23 PROCEDURE — 36415 COLL VENOUS BLD VENIPUNCTURE: CPT

## 2025-02-23 RX ORDER — ONDANSETRON 2 MG/ML
4 INJECTION INTRAMUSCULAR; INTRAVENOUS ONCE
Status: COMPLETED | OUTPATIENT
Start: 2025-02-23 | End: 2025-02-23

## 2025-02-23 RX ORDER — SODIUM CHLORIDE 9 MG/ML
INJECTION, SOLUTION INTRAVENOUS CONTINUOUS
Status: DISCONTINUED | OUTPATIENT
Start: 2025-02-23 | End: 2025-02-23 | Stop reason: HOSPADM

## 2025-02-23 RX ORDER — MORPHINE SULFATE 4 MG/ML
4 INJECTION INTRAVENOUS ONCE
Status: COMPLETED | OUTPATIENT
Start: 2025-02-23 | End: 2025-02-23

## 2025-02-23 RX ORDER — HYDROCODONE BITARTRATE AND ACETAMINOPHEN 5; 325 MG/1; MG/1
1 TABLET ORAL EVERY 4 HOURS PRN
Qty: 20 TABLET | Refills: 0 | Status: SHIPPED | OUTPATIENT
Start: 2025-02-23 | End: 2025-02-26

## 2025-02-23 RX ADMIN — SODIUM CHLORIDE: 9 INJECTION, SOLUTION INTRAVENOUS at 18:56

## 2025-02-23 RX ADMIN — ONDANSETRON 4 MG: 2 INJECTION INTRAMUSCULAR; INTRAVENOUS at 18:53

## 2025-02-23 RX ADMIN — MORPHINE SULFATE 4 MG: 4 INJECTION, SOLUTION INTRAMUSCULAR; INTRAVENOUS at 18:53

## 2025-02-24 NOTE — DISCHARGE INSTRUCTIONS
Make sure that you take your medications as directed when you get home.  I am sending a prescription for pain medication to your local pharmacy that you can  tomorrow.  Ice your hip tonight to help with any pain then you can apply warm moist compresses starting tomorrow.  Make sure that you take stool softeners and food with the pain medications to avoid constipation.  If any problems or worsening.

## 2025-02-24 NOTE — ED PROVIDER NOTES
ER Provider Note    Scribed for Dr. Gwendolyn Joiner D.O. by So Tena. 2025  6:15 PM    Primary Care Provider: Farhan aTn M.D.    CHIEF COMPLAINT  Chief Complaint   Patient presents with    T-5000 GLF     Pt reports she tripped over her dog 40 min ago  C/O  acute Rt hip pain  Hx chronic multiple joint pains and [place]'s Disease       EXTERNAL RECORDS REVIEWED  Outpatient Notes The patient was seen at [facility] on  of this year for her third visco supplementation injection into both knees.     HPI/ROS  LIMITATION TO HISTORY   Select: : None    OUTSIDE HISTORIAN(S):  None.     Taina Block is a 60 y.o. female who has history of Jam's disease presents to the ED for evaluation after a ground level fall onset about 40 minutes ago. She describes that she tripped over her dog, as her dog is the same color as her rug and she was distracted reading a proposal. The patient now has limited range of motion and moderate pain to her right hip. No alleviating or exacerbating factors noted. She denies pain anywhere else. She does mention that she has history of chronic joint pain.     PAST MEDICAL HISTORY  Past Medical History:   Diagnosis Date    [place]'s disease (HCC)        SURGICAL HISTORY  Past Surgical History:   Procedure Laterality Date    GYN SURGERY          OTHER Bilateral     Corneal lens replacement, Rt breast lumpectomy    OTHER ABDOMINAL SURGERY      pilonidal cyst resection    OTHER ORTHOPEDIC SURGERY      Bilat knee, rt elbow, Lt hip, rt shoulder       FAMILY HISTORY  History reviewed. No pertinent family history.      SOCIAL HISTORY   reports that she has quit smoking. Her smoking use included cigarettes. She has never used smokeless tobacco. She reports that she does not drink alcohol and does not use drugs.      CURRENT MEDICATIONS  No current outpatient medications       ALLERGIES  Other misc      PHYSICAL EXAM  /65   Pulse 95  "  Temp 36.8 °C (98.3 °F) (Temporal)   Resp 16   Ht 1.702 m (5' 7\")   Wt 48.9 kg (107 lb 12.9 oz)   SpO2 95%   BMI 16.88 kg/m²     Constitutional: Very thin, chronically ill appearing female in moderate distress.   HENT: Normocephalic, nares are patent and clear, oral mucosa is moist.  Cardiovascular: Normal heart rate and Regular rhythm. No murmur  Thorax & Lungs: Clear and equal breath sounds with good excursion. No respiratory distress, no rhonchi, wheezing or rales.   Abdomen: Bowel sounds normal in all four quadrants. Soft,nontender, no rebound , guarding, palpable masses.   Skin: Pale, Warm, Dry, No contusions,  No rashes.    Extremities: Peripheral pulses 4/4 No edema, Right hip is tender on the lateral aspect and proximal femur. No contusions or abrasion. No edema.  Neurovascular intact.    Musculoskeletal: Limited range of motion of the right lower extremity secondary to pain. Does have some right leg shortening.  No major deformities noted.  Neurologic: Alert & oriented x 3, Normal motor function, Normal sensory function  Psychiatric: Affect normal, Judgment normal, Mood normal.       DIAGNOSTIC STUDIES & PROCEDURES    Labs:   Results for orders placed or performed during the hospital encounter of 02/23/25   CBC WITH DIFFERENTIAL    Collection Time: 02/23/25  6:47 PM   Result Value Ref Range    WBC 7.9 4.8 - 10.8 K/uL    RBC 4.09 (L) 4.20 - 5.40 M/uL    Hemoglobin 14.1 12.0 - 16.0 g/dL    Hematocrit 41.0 37.0 - 47.0 %    .2 (H) 81.4 - 97.8 fL    MCH 34.5 (H) 27.0 - 33.0 pg    MCHC 34.4 32.2 - 35.5 g/dL    RDW 49.9 35.9 - 50.0 fL    Platelet Count 218 164 - 446 K/uL    MPV 10.3 9.0 - 12.9 fL    Neutrophils-Polys 51.00 44.00 - 72.00 %    Lymphocytes 39.60 22.00 - 41.00 %    Monocytes 7.60 0.00 - 13.40 %    Eosinophils 1.10 0.00 - 6.90 %    Basophils 0.40 0.00 - 1.80 %    Immature Granulocytes 0.30 0.00 - 0.90 %    Nucleated RBC 0.00 0.00 - 0.20 /100 WBC    Neutrophils (Absolute) 4.00 1.82 - 7.42 " K/uL    Lymphs (Absolute) 3.11 1.00 - 4.80 K/uL    Monos (Absolute) 0.60 0.00 - 0.85 K/uL    Eos (Absolute) 0.09 0.00 - 0.51 K/uL    Baso (Absolute) 0.03 0.00 - 0.12 K/uL    Immature Granulocytes (abs) 0.02 0.00 - 0.11 K/uL    NRBC (Absolute) 0.00 K/uL   COMP METABOLIC PANEL    Collection Time: 25  6:47 PM   Result Value Ref Range    Sodium 139 135 - 145 mmol/L    Potassium 4.6 3.6 - 5.5 mmol/L    Chloride 105 96 - 112 mmol/L    Co2 26 20 - 33 mmol/L    Anion Gap 8.0 7.0 - 16.0    Glucose 75 65 - 99 mg/dL    Bun 18 8 - 22 mg/dL    Creatinine 0.67 0.50 - 1.40 mg/dL    Calcium 9.1 8.4 - 10.2 mg/dL    Correct Calcium 8.9 8.5 - 10.5 mg/dL    AST(SGOT) 24 12 - 45 U/L    ALT(SGPT) 19 2 - 50 U/L    Alkaline Phosphatase 47 30 - 99 U/L    Total Bilirubin 0.4 0.1 - 1.5 mg/dL    Albumin 4.2 3.2 - 4.9 g/dL    Total Protein 6.1 6.0 - 8.2 g/dL    Globulin 1.9 1.9 - 3.5 g/dL    A-G Ratio 2.2 g/dL   ESTIMATED GFR    Collection Time: 25  6:47 PM   Result Value Ref Range    GFR (CKD-EPI) 100 >60 mL/min/1.73 m 2   EKG (NOW)    Collection Time: 25  7:19 PM   Result Value Ref Range    Report       [facility] Emergency Dept.    Test Date:  2025  Pt Name:    HUGO ORO                  Department: [facility]  MRN:        [id number]                      Room:       -ROOM 8  Gender:     Female                       Technician: [id number]  :        1965                   Requested By:JOHNNY PACHECO  Order #:    [id number]                    Reading MD:    Measurements  Intervals                                Axis  Rate:       66                           P:          68  CA:         129                          QRS:        32  QRSD:       98                           T:          47  QT:         395  QTc:        414    Interpretive Statements  Sinus rhythm  Borderline low voltage, extremity leads  RSR' in V1 or V2, probably normal variant  No previous ECG available for comparison     URINALYSIS  (UA)    Collection Time: 02/23/25  7:30 PM    Specimen: Urine   Result Value Ref Range    Color Yellow     Character Clear     Specific Gravity 1.015 <1.035    Ph 5.5 5.0 - 8.0    Glucose Negative Negative mg/dL    Ketones Negative Negative mg/dL    Protein Negative Negative mg/dL    Bilirubin Negative Negative    Urobilinogen, Urine 0.2 <=1.0 EU/dL    Nitrite Negative Negative    Leukocyte Esterase Negative Negative    Occult Blood Negative Negative    Micro Urine Req see below       All labs reviewed by me.      EKG:   I have independently interpreted this EKG       Radiology:   The attending Emergency Physician has independently interpreted the diagnostic imaging associated with this visit and is awaiting the final reading from the radiologist, which will be displayed below.    Preliminary interpretation is a follows: No acute fracture, no pneumonia  Radiologist interpretation:    DX-HIP-COMPLETE - UNILATERAL 2+ RIGHT   Final Result      No acute osseous abnormality.      DX-CHEST-PORTABLE (1 VIEW)   Final Result      No acute cardiopulmonary abnormality.              COURSE & MEDICAL DECISION MAKING    Hydration: Based on the patient's presentation of Other renal insufficiency the patient was given IV fluids. IV Hydration was used because oral hydration was not adequate alone. Upon recheck following hydration, the patient was improved.     INITIAL ASSESSMENT AND PLAN  Care Narrative:       6:15 PM - Patient seen and evaluated at bedside. This is a 60 year old woman who presents to the emergency department for evaluation of right hip pain after tripping over her dog earlier today. Discussed plan of care, including performing imaging, as well as an EKG and lab work due to her history. Patient agrees to plan of care. Patient will be treated with NS fluids, Zofran 4 mg and morphine 4 mg for her symptoms. Ordered an EKG, Prothrombin Time, APTT, CMP, CBC w/ Diff., UA, DX-Chest and DX-Hip Unilateral w/ Pelvis (Right)  to evaluate. Differential diagnoses include but are not limited to: fracture, contusion    7:45 PM - The patient was reevaluated at bedside. There is no evidence of fracture on her scan. She will be discharged with [facility] for at home pain management. Discussed discharge instructions and return precautions with the patient and they were cleared for discharge. Patient was given the opportunity to ask any further questions. She is comfortable with discharge at this time.                    DISPOSITION AND DISCUSSIONS  I have discussed management of the patient with the following physicians and SHERMAN's: None.    Discussion of management with other QHP or appropriate source(s): None     Barriers to care at this time, including but not limited to:  None known .     Decision tools and prescription drugs considered including, but not limited to: Current home medications, prescription for Norco 5 mg #20..    I reviewed prescription monitoring program for patient's narcotic use before prescribing a scheduled drug.The patient will not drink alcohol nor drive with prescribed medications.    The patient will return for new or worsening symptoms and is stable at the time of discharge.      In prescribing controlled substances to this patient, I certify that I have obtained and reviewed the medical history of Taina Block. I have also made a good rowena effort to obtain applicable records from other providers who have treated the patient and records did not demonstrate any increased risk of substance abuse that would prevent me from prescribing controlled substances.     I have conducted a physical exam and documented it. I have reviewed Ms. Block’s prescription history as maintained by the [place] Prescription Monitoring Program.     I have assessed the patient’s risk for abuse, dependency, and addiction using the validated Opioid Risk Tool available at https://www.mdcalc.com/ffpcvs-gzzn-dzgv-ort-narcotic-abuse.     Given the above,  I believe the benefits of controlled substance therapy outweigh the risks. The reasons for prescribing controlled substances include non-narcotic, oral analgesic alternatives are contraindicated. Accordingly, I have discussed the risk and benefits, treatment plan, and alternative therapies with the patient.      DISPOSITION:  Patient will be discharged home in stable condition.    FOLLOW UP:  Farhan Tan M.D.  [address]  [place] [address]  [phone]    Schedule an appointment as soon as possible for a visit in 2 days  As needed, If symptoms worsen      OUTPATIENT MEDICATIONS:  Discharge Medication List as of 2/23/2025  8:03 PM        START taking these medications    Details   HYDROcodone-acetaminophen (NORCO) 5-325 MG Tab per tablet Take 1 Tablet by mouth every four hours as needed (Severe pain) for up to 3 days. Take with food and stool softeners, Disp-20 Tablet, R-0, Normal              FINAL IMPRESSION   1. Fall from ground level    2. Contusion of right hip, initial encounter    3. History of Jam's disease      So IRVING (Scribe), am scribing for, and in the presence of, Gwendolyn Joiner D.O..    Electronically signed by: So Tena (Scribe), 2/23/2025    Gwendolyn IRVING D.O. personally performed the services described in this documentation, as scribed by So Tena in my presence, and it is both accurate and complete.    The note accurately reflects work and decisions made by me.  Gwendolyn Joiner D.O.  2/24/2025  12:51 AM

## 2025-02-25 ENCOUNTER — APPOINTMENT (OUTPATIENT)
Dept: URBAN - METROPOLITAN AREA CLINIC 20 | Facility: CLINIC | Age: 60
Setting detail: DERMATOLOGY
End: 2025-02-25

## 2025-02-25 DIAGNOSIS — L56.5 DISSEMINATED SUPERFICIAL ACTINIC POROKERATOSIS (DSAP): ICD-10-CM

## 2025-02-25 DIAGNOSIS — E27 OTHER DISORDERS OF ADRENAL GLAND: ICD-10-CM

## 2025-02-25 DIAGNOSIS — D69.2 OTHER NONTHROMBOCYTOPENIC PURPURA: ICD-10-CM

## 2025-02-25 DIAGNOSIS — I73.00 RAYNAUD'S SYNDROME WITHOUT GANGRENE: ICD-10-CM

## 2025-02-25 DIAGNOSIS — Z71.89 OTHER SPECIFIED COUNSELING: ICD-10-CM

## 2025-02-25 PROBLEM — E27.40 UNSPECIFIED ADRENOCORTICAL INSUFFICIENCY: Status: ACTIVE | Noted: 2025-02-25

## 2025-02-25 PROCEDURE — ? ADDITIONAL NOTES

## 2025-02-25 PROCEDURE — 99213 OFFICE O/P EST LOW 20 MIN: CPT

## 2025-02-25 PROCEDURE — ? COUNSELING

## 2025-02-25 ASSESSMENT — LOCATION ZONE DERM
LOCATION ZONE: FINGER
LOCATION ZONE: LEG
LOCATION ZONE: ARM
LOCATION ZONE: TRUNK

## 2025-02-25 ASSESSMENT — LOCATION DETAILED DESCRIPTION DERM
LOCATION DETAILED: RIGHT DISTAL PRETIBIAL REGION
LOCATION DETAILED: LEFT PROXIMAL PRETIBIAL REGION
LOCATION DETAILED: LEFT PROXIMAL DORSAL FOREARM
LOCATION DETAILED: RIGHT MEDIAL SUPERIOR CHEST
LOCATION DETAILED: LEFT DISTAL DORSAL FOREARM
LOCATION DETAILED: LEFT PROXIMAL DORSAL MIDDLE FINGER
LOCATION DETAILED: RIGHT PROXIMAL PRETIBIAL REGION
LOCATION DETAILED: RIGHT PROXIMAL DORSAL FOREARM
LOCATION DETAILED: LEFT DISTAL PRETIBIAL REGION
LOCATION DETAILED: RIGHT DISTAL DORSAL FOREARM
LOCATION DETAILED: RIGHT PROXIMAL DORSAL MIDDLE FINGER

## 2025-02-25 ASSESSMENT — LOCATION SIMPLE DESCRIPTION DERM
LOCATION SIMPLE: RIGHT FOREARM
LOCATION SIMPLE: CHEST
LOCATION SIMPLE: LEFT PRETIBIAL REGION
LOCATION SIMPLE: LEFT MIDDLE FINGER
LOCATION SIMPLE: RIGHT MIDDLE FINGER
LOCATION SIMPLE: RIGHT PRETIBIAL REGION
LOCATION SIMPLE: LEFT FOREARM

## 2025-02-25 NOTE — HPI: WOUND CHECK
How Is Your Wound Healing?: healing slowly
Additional History: Pt is here to obtain referral to Dr. Ha Conklin to become established with him for her history of chronic oral steroids for [place] disease and frequent wounds from minor trauma that can be difficult to heal. Today, she does not have any concerning wounds and states that she usually does well  with home care.

## 2025-02-25 NOTE — PROCEDURE: ADDITIONAL NOTES
Detail Level: Simple
Render Risk Assessment In Note?: no
Additional Notes: Pt complains of frequent injury\\nRecommended keeping lesions moist and using covering\\nPt reports that minor trauma to skin causes purpura which then sometimes become major open sores needing wound care. She wants to have a consultation with Dr. Ha Conklin so that when she does have an issue, she is established with him. We will task VICK Yusuf and request consultation with Dr. Conklin.
Additional Notes: Pt has compound from [facility]; has statin/cholesterol compound crm. she uses it bid prn\\nOffered refill if needed
Additional Notes: Recommended sleeping in cotton gloves with thick moisturizer

## 2025-02-25 NOTE — PROCEDURE: COUNSELING
Detail Level: Detailed
Patient Specific Counseling (Will Not Stick From Patient To Patient): Pt reports that minor trauma to skin causes purpura which then sometimes become major open sores needing wound care. She wants to have a consultation with Dr. Ha Conklin so that when she does have an issue, she is established with him. We will task VICK Yusuf and request consultation with Dr. Conklin.
Detail Level: Generalized

## 2025-03-05 ENCOUNTER — APPOINTMENT (OUTPATIENT)
Dept: URBAN - METROPOLITAN AREA CLINIC 20 | Facility: CLINIC | Age: 60
Setting detail: DERMATOLOGY
End: 2025-03-05

## 2025-03-05 DIAGNOSIS — L90.5 SCAR CONDITIONS AND FIBROSIS OF SKIN: ICD-10-CM

## 2025-03-05 DIAGNOSIS — E27 OTHER DISORDERS OF ADRENAL GLAND: ICD-10-CM | Status: STABLE

## 2025-03-05 DIAGNOSIS — T81.89 OTHER COMPLICATIONS OF PROCEDURES, NOT ELSEWHERE CLASSIFIED: ICD-10-CM

## 2025-03-05 PROBLEM — E27.40 UNSPECIFIED ADRENOCORTICAL INSUFFICIENCY: Status: ACTIVE | Noted: 2025-03-05

## 2025-03-05 PROBLEM — T81.89XA OTHER COMPLICATIONS OF PROCEDURES, NOT ELSEWHERE CLASSIFIED, INITIAL ENCOUNTER: Status: ACTIVE | Noted: 2025-03-05

## 2025-03-05 PROCEDURE — ? ADDITIONAL NOTES

## 2025-03-05 PROCEDURE — 99213 OFFICE O/P EST LOW 20 MIN: CPT

## 2025-03-05 PROCEDURE — ? COUNSELING

## 2025-03-05 ASSESSMENT — LOCATION ZONE DERM
LOCATION ZONE: LEG
LOCATION ZONE: TRUNK

## 2025-03-05 ASSESSMENT — LOCATION DETAILED DESCRIPTION DERM
LOCATION DETAILED: LEFT DISTAL PRETIBIAL REGION
LOCATION DETAILED: RIGHT MEDIAL SUPERIOR CHEST

## 2025-03-05 ASSESSMENT — LOCATION SIMPLE DESCRIPTION DERM
LOCATION SIMPLE: CHEST
LOCATION SIMPLE: LEFT PRETIBIAL REGION

## 2025-03-05 NOTE — PROCEDURE: COUNSELING
Patient Specific Counseling (Will Not Stick From Patient To Patient): Pt managing care at home. No need for MD to provide care or dressings for this area per pt. Pt stated she will get wound to close on her own.
Detail Level: Detailed

## 2025-03-05 NOTE — HPI: WOUND CHECK
Additional History: Secondary to chronic steroid administration. Leads to skin tears, blood ulcers and chronic wounds Dorsal Nasal Flap Text: The defect edges were debeveled with a #15 scalpel blade.  Given the location of the defect and the proximity to free margins a dorsal nasal flap was deemed most appropriate.  Using a sterile surgical marker, an appropriate dorsal nasal flap was drawn around the defect.    The area thus outlined was incised deep to adipose tissue with a #15 scalpel blade.  The skin margins were undermined to an appropriate distance in all directions utilizing iris scissors.

## 2025-03-05 NOTE — PROCEDURE: ADDITIONAL NOTES
Render Risk Assessment In Note?: no
Additional Notes: Pt has recurring wounds due to systemic steroid. Has been on 30mg a day since 2019. Leads to skin tears. Often wounds are secondary to falls. Uses cane to reduce risk. Suggested using walker. Not plausible due to shoulder injuries. Going to coordinate care with patient’s other providers.\\n\\nPCP: Dr. Ridge Muir\\nEndocrinology: Dr. Miller\\nFollow up for rotator cuff issues next week \\n\\nPt has pack of wound care that she does on her own. Wound care typically consists of hydrafera blue and emollients. Manages well but will contact office if severe tear or wound occurs. Pt provided previous wound photos. Scanned into pt file.
Detail Level: Simple
Additional Notes: Secondary to fall with roller bag. Had wound vac previously, almost lost leg. Still experiencing pain in area. Using scar gels. Not open, no infection risk at this time.

## 2025-03-11 ENCOUNTER — HOSPITAL ENCOUNTER (OUTPATIENT)
Facility: MEDICAL CENTER | Age: 60
End: 2025-03-11
Attending: INTERNAL MEDICINE
Payer: COMMERCIAL

## 2025-03-11 LAB
25(OH)D3 SERPL-MCNC: 22 NG/ML (ref 30–100)
ALBUMIN SERPL BCP-MCNC: 4.2 G/DL (ref 3.2–4.9)
ALBUMIN/GLOB SERPL: 2.3 G/DL
ALP SERPL-CCNC: 72 U/L (ref 30–99)
ALT SERPL-CCNC: 26 U/L (ref 2–50)
ANION GAP SERPL CALC-SCNC: 10 MMOL/L (ref 7–16)
AST SERPL-CCNC: 25 U/L (ref 12–45)
BILIRUB SERPL-MCNC: <0.2 MG/DL (ref 0.1–1.5)
BUN SERPL-MCNC: 13 MG/DL (ref 8–22)
CALCIUM ALBUM COR SERPL-MCNC: 8.8 MG/DL (ref 8.5–10.5)
CALCIUM SERPL-MCNC: 9 MG/DL (ref 8.4–10.2)
CHLORIDE SERPL-SCNC: 107 MMOL/L (ref 96–112)
CO2 SERPL-SCNC: 26 MMOL/L (ref 20–33)
CORTIS SERPL-MCNC: 8.1 UG/DL (ref 0–23)
CREAT SERPL-MCNC: 0.78 MG/DL (ref 0.5–1.4)
GFR SERPLBLD CREATININE-BSD FMLA CKD-EPI: 87 ML/MIN/1.73 M 2
GLOBULIN SER CALC-MCNC: 1.8 G/DL (ref 1.9–3.5)
GLUCOSE SERPL-MCNC: 83 MG/DL (ref 65–99)
POTASSIUM SERPL-SCNC: 3.9 MMOL/L (ref 3.6–5.5)
PROT SERPL-MCNC: 6 G/DL (ref 6–8.2)
SODIUM SERPL-SCNC: 143 MMOL/L (ref 135–145)
T3FREE SERPL-MCNC: 3.3 PG/ML (ref 2–4.4)
T4 FREE SERPL-MCNC: 1.49 NG/DL (ref 0.93–1.7)
THYROPEROXIDASE AB SERPL-ACNC: 11.9 IU/ML (ref 0–9)
TSH SERPL DL<=0.005 MIU/L-ACNC: 2.03 UIU/ML (ref 0.38–5.33)
VIT B12 SERPL-MCNC: 719 PG/ML (ref 211–911)

## 2025-03-11 PROCEDURE — 84481 FREE ASSAY (FT-3): CPT

## 2025-03-11 PROCEDURE — 86376 MICROSOMAL ANTIBODY EACH: CPT

## 2025-03-11 PROCEDURE — 82533 TOTAL CORTISOL: CPT

## 2025-03-11 PROCEDURE — 80053 COMPREHEN METABOLIC PANEL: CPT

## 2025-03-11 PROCEDURE — 84439 ASSAY OF FREE THYROXINE: CPT

## 2025-03-11 PROCEDURE — 82306 VITAMIN D 25 HYDROXY: CPT

## 2025-03-11 PROCEDURE — 36415 COLL VENOUS BLD VENIPUNCTURE: CPT

## 2025-03-11 PROCEDURE — 82607 VITAMIN B-12: CPT

## 2025-03-11 PROCEDURE — 82024 ASSAY OF ACTH: CPT

## 2025-03-11 PROCEDURE — 84443 ASSAY THYROID STIM HORMONE: CPT

## 2025-03-12 LAB — ACTH PLAS-MCNC: 14.6 PG/ML (ref 7.2–63.3)

## 2025-03-21 ENCOUNTER — HOSPITAL ENCOUNTER (OUTPATIENT)
Dept: RADIOLOGY | Facility: MEDICAL CENTER | Age: 60
End: 2025-03-21
Payer: COMMERCIAL

## 2025-03-21 DIAGNOSIS — S32.9XXA FRACTURE OF UNSPECIFIED PARTS OF LUMBOSACRAL SPINE AND PELVIS, INITIAL ENCOUNTER FOR CLOSED FRACTURE (HCC): ICD-10-CM

## 2025-03-21 PROCEDURE — 72195 MRI PELVIS W/O DYE: CPT

## 2025-03-27 ENCOUNTER — APPOINTMENT (OUTPATIENT)
Dept: RADIOLOGY | Facility: MEDICAL CENTER | Age: 60
End: 2025-03-27
Attending: EMERGENCY MEDICINE
Payer: COMMERCIAL

## 2025-03-27 ENCOUNTER — HOSPITAL ENCOUNTER (EMERGENCY)
Facility: MEDICAL CENTER | Age: 60
End: 2025-03-27
Attending: EMERGENCY MEDICINE
Payer: COMMERCIAL

## 2025-03-27 VITALS
OXYGEN SATURATION: 98 % | HEART RATE: 72 BPM | HEIGHT: 67 IN | TEMPERATURE: 97.8 F | BODY MASS INDEX: 16.54 KG/M2 | SYSTOLIC BLOOD PRESSURE: 92 MMHG | RESPIRATION RATE: 16 BRPM | DIASTOLIC BLOOD PRESSURE: 68 MMHG | WEIGHT: 105.38 LBS

## 2025-03-27 DIAGNOSIS — Z86.39 HISTORY OF ADDISON'S DISEASE: ICD-10-CM

## 2025-03-27 DIAGNOSIS — R41.0 CONFUSION WITH NON-FOCAL NEURO EXAM: ICD-10-CM

## 2025-03-27 LAB
ALBUMIN SERPL BCP-MCNC: 4.3 G/DL (ref 3.2–4.9)
ALBUMIN/GLOB SERPL: 2.2 G/DL
ALP SERPL-CCNC: 50 U/L (ref 30–99)
ALT SERPL-CCNC: 22 U/L (ref 2–50)
ANION GAP SERPL CALC-SCNC: 9 MMOL/L (ref 7–16)
APPEARANCE UR: CLEAR
AST SERPL-CCNC: 21 U/L (ref 12–45)
BASOPHILS # BLD AUTO: 0.5 % (ref 0–1.8)
BASOPHILS # BLD: 0.03 K/UL (ref 0–0.12)
BILIRUB SERPL-MCNC: 0.4 MG/DL (ref 0.1–1.5)
BILIRUB UR QL STRIP.AUTO: NEGATIVE
BUN SERPL-MCNC: 19 MG/DL (ref 8–22)
CALCIUM ALBUM COR SERPL-MCNC: 9.2 MG/DL (ref 8.5–10.5)
CALCIUM SERPL-MCNC: 9.4 MG/DL (ref 8.5–10.5)
CHLORIDE SERPL-SCNC: 104 MMOL/L (ref 96–112)
CK SERPL-CCNC: 106 U/L (ref 0–154)
CO2 SERPL-SCNC: 27 MMOL/L (ref 20–33)
COLOR UR: YELLOW
CREAT SERPL-MCNC: 0.64 MG/DL (ref 0.5–1.4)
EOSINOPHIL # BLD AUTO: 0.08 K/UL (ref 0–0.51)
EOSINOPHIL NFR BLD: 1.2 % (ref 0–6.9)
ERYTHROCYTE [DISTWIDTH] IN BLOOD BY AUTOMATED COUNT: 49.5 FL (ref 35.9–50)
GFR SERPLBLD CREATININE-BSD FMLA CKD-EPI: 101 ML/MIN/1.73 M 2
GLOBULIN SER CALC-MCNC: 2 G/DL (ref 1.9–3.5)
GLUCOSE SERPL-MCNC: 84 MG/DL (ref 65–99)
GLUCOSE UR STRIP.AUTO-MCNC: NEGATIVE MG/DL
HCT VFR BLD AUTO: 43.4 % (ref 37–47)
HGB BLD-MCNC: 14.5 G/DL (ref 12–16)
IMM GRANULOCYTES # BLD AUTO: 0.01 K/UL (ref 0–0.11)
IMM GRANULOCYTES NFR BLD AUTO: 0.2 % (ref 0–0.9)
KETONES UR STRIP.AUTO-MCNC: ABNORMAL MG/DL
LEUKOCYTE ESTERASE UR QL STRIP.AUTO: NEGATIVE
LYMPHOCYTES # BLD AUTO: 2.85 K/UL (ref 1–4.8)
LYMPHOCYTES NFR BLD: 44 % (ref 22–41)
MAGNESIUM SERPL-MCNC: 1.8 MG/DL (ref 1.5–2.5)
MCH RBC QN AUTO: 33.9 PG (ref 27–33)
MCHC RBC AUTO-ENTMCNC: 33.4 G/DL (ref 32.2–35.5)
MCV RBC AUTO: 101.4 FL (ref 81.4–97.8)
MICRO URNS: ABNORMAL
MONOCYTES # BLD AUTO: 0.45 K/UL (ref 0–0.85)
MONOCYTES NFR BLD AUTO: 7 % (ref 0–13.4)
NEUTROPHILS # BLD AUTO: 3.05 K/UL (ref 1.82–7.42)
NEUTROPHILS NFR BLD: 47.1 % (ref 44–72)
NITRITE UR QL STRIP.AUTO: NEGATIVE
NRBC # BLD AUTO: 0 K/UL
NRBC BLD-RTO: 0 /100 WBC (ref 0–0.2)
NT-PROBNP SERPL IA-MCNC: 97 PG/ML (ref 0–125)
PH UR STRIP.AUTO: 5.5 [PH] (ref 5–8)
PHOSPHATE SERPL-MCNC: 3.3 MG/DL (ref 2.5–4.5)
PLATELET # BLD AUTO: 228 K/UL (ref 164–446)
PMV BLD AUTO: 10.6 FL (ref 9–12.9)
POTASSIUM SERPL-SCNC: 3.8 MMOL/L (ref 3.6–5.5)
PROCALCITONIN SERPL-MCNC: <0.02 NG/ML
PROT SERPL-MCNC: 6.3 G/DL (ref 6–8.2)
PROT UR QL STRIP: NEGATIVE MG/DL
RBC # BLD AUTO: 4.28 M/UL (ref 4.2–5.4)
RBC UR QL AUTO: NEGATIVE
SODIUM SERPL-SCNC: 140 MMOL/L (ref 135–145)
SP GR UR STRIP.AUTO: 1.02
TROPONIN T SERPL-MCNC: <6 NG/L (ref 6–19)
TSH SERPL DL<=0.005 MIU/L-ACNC: 1.99 UIU/ML (ref 0.38–5.33)
UROBILINOGEN UR STRIP.AUTO-MCNC: 1 EU/DL
WBC # BLD AUTO: 6.5 K/UL (ref 4.8–10.8)

## 2025-03-27 PROCEDURE — 84484 ASSAY OF TROPONIN QUANT: CPT

## 2025-03-27 PROCEDURE — 83735 ASSAY OF MAGNESIUM: CPT

## 2025-03-27 PROCEDURE — 36415 COLL VENOUS BLD VENIPUNCTURE: CPT

## 2025-03-27 PROCEDURE — 83880 ASSAY OF NATRIURETIC PEPTIDE: CPT

## 2025-03-27 PROCEDURE — 84100 ASSAY OF PHOSPHORUS: CPT

## 2025-03-27 PROCEDURE — 99284 EMERGENCY DEPT VISIT MOD MDM: CPT

## 2025-03-27 PROCEDURE — 70450 CT HEAD/BRAIN W/O DYE: CPT

## 2025-03-27 PROCEDURE — 82550 ASSAY OF CK (CPK): CPT

## 2025-03-27 PROCEDURE — 700105 HCHG RX REV CODE 258

## 2025-03-27 PROCEDURE — 85025 COMPLETE CBC W/AUTO DIFF WBC: CPT

## 2025-03-27 PROCEDURE — 71045 X-RAY EXAM CHEST 1 VIEW: CPT

## 2025-03-27 PROCEDURE — 81003 URINALYSIS AUTO W/O SCOPE: CPT

## 2025-03-27 PROCEDURE — 84145 PROCALCITONIN (PCT): CPT

## 2025-03-27 PROCEDURE — A9270 NON-COVERED ITEM OR SERVICE: HCPCS

## 2025-03-27 PROCEDURE — 84443 ASSAY THYROID STIM HORMONE: CPT

## 2025-03-27 PROCEDURE — 700102 HCHG RX REV CODE 250 W/ 637 OVERRIDE(OP)

## 2025-03-27 PROCEDURE — 80053 COMPREHEN METABOLIC PANEL: CPT

## 2025-03-27 RX ORDER — IBUPROFEN 600 MG/1
600 TABLET, FILM COATED ORAL ONCE
Status: COMPLETED | OUTPATIENT
Start: 2025-03-27 | End: 2025-03-27

## 2025-03-27 RX ORDER — SODIUM CHLORIDE, SODIUM LACTATE, POTASSIUM CHLORIDE, AND CALCIUM CHLORIDE .6; .31; .03; .02 G/100ML; G/100ML; G/100ML; G/100ML
1000 INJECTION, SOLUTION INTRAVENOUS ONCE
Status: COMPLETED | OUTPATIENT
Start: 2025-03-27 | End: 2025-03-27

## 2025-03-27 RX ORDER — METOCLOPRAMIDE 10 MG/1
10 TABLET ORAL ONCE
Status: COMPLETED | OUTPATIENT
Start: 2025-03-27 | End: 2025-03-27

## 2025-03-27 RX ADMIN — METOCLOPRAMIDE 10 MG: 10 TABLET ORAL at 21:24

## 2025-03-27 RX ADMIN — SODIUM CHLORIDE, POTASSIUM CHLORIDE, SODIUM LACTATE AND CALCIUM CHLORIDE 1000 ML: 600; 310; 30; 20 INJECTION, SOLUTION INTRAVENOUS at 21:01

## 2025-03-27 RX ADMIN — IBUPROFEN 600 MG: 600 TABLET, FILM COATED ORAL at 21:24

## 2025-03-27 ASSESSMENT — FIBROSIS 4 INDEX: FIB4 SCORE: 1.35

## 2025-03-28 NOTE — DISCHARGE INSTRUCTIONS
Continue taking your medications as prescribed.  Try to get as much rest as you can over the next few days.  Follow-up with your doctors as scheduled.  If you feel that your symptoms are getting worse and worse, or are significantly different than your chronic symptoms, you can always return to the ER at any point to be reevaluated for any new findings.

## 2025-03-28 NOTE — ED TRIAGE NOTES
"Chief Complaint   Patient presents with    Other     Pt c/o disoriented and brain fog, fatigue that has been constant that started yesterday. +double vision. Last night she noticed she had periods of confusion and drooling. AO4, amb w steady gait.   Pt has hx of Addisons disease and is currently on high dose steroids.       Blood Pressure Problem     Pt concerned about periodic low blood pressure last night - 69/48     /66   Pulse 74   Temp 36.6 °C (97.8 °F) (Temporal)   Resp 18   Ht 1.702 m (5' 7\")   Wt 47.8 kg (105 lb 6.1 oz)   SpO2 99%   BMI 16.50 kg/m²     "

## 2025-03-28 NOTE — ED PROVIDER NOTES
ED Provider Note    CHIEF COMPLAINT  Chief Complaint   Patient presents with    Other     Pt c/o disoriented and brain fog, fatigue that has been constant that started yesterday. +double vision. Last night she noticed she had periods of confusion and drooling. AO4, amb w steady gait.   Pt has hx of Addisons disease and is currently on high dose steroids.       Blood Pressure Problem     Pt concerned about periodic low blood pressure last night - 69/48       EXTERNAL RECORDS REVIEWED  Last seen in the ED February 2025 for ground-level fall.  No traumatic injuries were identified.    HPI/ROS  LIMITATION TO HISTORY   Select: : None  OUTSIDE HISTORIAN(S):  Family daughter at bedside provides additional details    Taina Block is a 60 y.o. female who presents to the ER for concern of intermittent altered mental status and fatigue.  She reports history of chronic medical problems including Jam's disease diagnosed 8 years ago for which she is on hydrocortisone and fludrocortisone which was just recently started 10 days ago.  However she reports that she is fairly resistant to this therapy and is continuing to work with her PCP and endocrinologist and go to specialist at the [facility].  Over the past few days she has had continued low blood pressures, which is not too unusual for her but at times they have gotten to 70s over 40s at home.  She has had intermittent lightheadedness which again is not too unusual for her but seems to be a little bit more prevalent past few days.  She also notes multiple episodes of essentially spacing out, 1 specifically where she came to when she had some drool at her mouth and did not remember what had happened.  She denies any recent falls or trauma.  Denies any focal numbness or weakness.  She is alert and oriented at this time.  No recent illnesses, no fevers.  She has been compliant with all of her medications.  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    PAST MEDICAL HISTORY   has a past medical history of [place]'s disease (HCC).    SURGICAL HISTORY   has a past surgical history that includes other orthopedic surgery; gyn surgery; other (Bilateral); and other abdominal surgery.    FAMILY HISTORY  History reviewed. No pertinent family history.    SOCIAL HISTORY  Social History     Tobacco Use    Smoking status: Former     Types: Cigarettes    Smokeless tobacco: Never   Vaping Use    Vaping status: Never Used   Substance and Sexual Activity    Alcohol use: Never    Drug use: Never    Sexual activity: Not on file       CURRENT MEDICATIONS  Home Medications       Reviewed by Abbey El R.N. (Registered Nurse) on 03/27/25 at 1904  Med List Status: Not Addressed     Medication Last Dose Status        Patient Jorge Taking any Medications                         Audit from Redirected Encounters    **Home medications have not yet been reviewed for this encounter**         ALLERGIES  Allergies   Allergen Reactions    Other Misc      Ceflosporins  Hx Jam's disease       PHYSICAL EXAM  VITAL SIGNS: BP 90/54   Pulse 70   Temp 36.6 °C (97.8 °F) (Temporal)   Resp 18   Ht 1.702 m (5' 7\")   Wt 47.8 kg (105 lb 6.1 oz)   SpO2 98%   BMI 16.50 kg/m²    General: Lying calmly in stretcher, alert, no distress  HEENT: NCAT, EOMI, pupils equal and reactive, normal conjunctiva, moist mucous membranes  Neck: No cervical lymphadenopathy  CV: Regular rate rhythm no murmurs  Pulmonary: CTAB normal work breathing  Abdomen: Soft, minimal tenderness throughout, no hepatosplenomegaly or masses appreciated  Skin: Warm and well-perfused, no rashes or jaundice on exposed skin  Neuro: Alert and oriented, GCS 15, cranial nerves II through XII individually tested and intact.  No dysmetria on finger-nose-finger.  5 out of 5  strength bilaterally, 5 out of 5 plantarflexion and dorsiflexion bilaterally.  Sensation to light " touch intact in all extremities.    EKG/LABS  CBC with normal cell counts, no leukocytosis to suggest infectious process, no anemia.  Procalcitonin undetectable, less likely major infectious process.  Troponin and BNP are normal.  Electrolytes including mag and Phos normal.  Normal renal function and liver enzymes.  TSH normal.  CPK normal.  UA trace ketones, no UTI.    RADIOLOGY/PROCEDURES   I have independently interpreted the diagnostic imaging associated with this visit and am waiting the final reading from the radiologist.   My preliminary interpretation is as follows: CT of the head shows mild atrophy expected for age, no evidence to suggest previous CVA, edema, space-occupying lesion or bleeding.  Chest x-ray shows clear lung fields, normal cardiomediastinal silhouette    Radiologist interpretation:  CT-HEAD W/O   Final Result      1.  No evidence of acute intracranial process.      2.  Mild atrophy.               DX-CHEST-PORTABLE (1 VIEW)   Final Result      No evidence of acute cardiopulmonary process.          COURSE & MEDICAL DECISION MAKING    ASSESSMENT, COURSE AND PLAN  Care Narrative: Differential includes dehydration, viral illness, electrolyte derangement, IVAN, CHF, intracranial mass, intracranial bleed, UTI, [place]'s disease    On arrival the patient is overall well-appearing.  She is hemodynamically stable, breathing comfortably on room air.  She is alert and oriented, no focal neurologic deficits noted on exam.  Differential broad, above considered.  IV was placed and she was given 1 L LR bolus, oral ibuprofen and Reglan.  CT of the head, chest x-ray ordered.  These were overall reassuring.  Mild atrophy, expected for age.  No space-occupying lesion noted, no bleed, no obvious CVA.  Chest x-ray normal.  Labs also reassuring, normal cell counts on CBC, electrolytes and renal function normal, liver enzymes normal, cardiac enzymes normal TSH normal.  Urinalysis without infectious markers.  At this  time she is resting comfortably.  Daughter did note that intermittently she would have small episodes where she would seem somewhat confused, asked her daughter to get something out of the refrigerator but then she quickly realized she was not at home.  Given her workup today I do not see any emergent or immediate threatening cause of her symptoms that she has been dealing with the past few days.  Continue outpatient management with her I feel she is safe for physicians.  She has follow-up appointments coming up next week.  Return precautions were provided.  She was then discharged home in stable condition.        DISPOSITION AND DISCUSSIONS  I have discussed management of the patient with the following physicians and SHERMAN's: N/A    Discussion of management with other QHP or appropriate source(s): None     Escalation of care considered, and ultimately not performed:acute inpatient care management, however at this time, the patient is most appropriate for outpatient management    Barriers to care at this time, including but not limited to: None.     Decision tools and prescription drugs considered including, but not limited to: N/A.    FINAL DIAGNOSIS  1. Confusion with non-focal neuro exam    2. History of [place]'s disease         Electronically signed by: Issa Granger M.D., 3/27/2025 7:36 PM

## 2025-03-31 ENCOUNTER — RX ONLY (RX ONLY)
Age: 60
End: 2025-03-31

## 2025-04-03 ENCOUNTER — APPOINTMENT (OUTPATIENT)
Dept: URBAN - METROPOLITAN AREA CLINIC 15 | Facility: CLINIC | Age: 60
Setting detail: DERMATOLOGY
End: 2025-04-03

## 2025-04-03 DIAGNOSIS — Z41.9 ENCOUNTER FOR PROCEDURE FOR PURPOSES OTHER THAN REMEDYING HEALTH STATE, UNSPECIFIED: ICD-10-CM

## 2025-04-03 PROCEDURE — ? BOTOX

## 2025-04-03 PROCEDURE — ? JUVEDERM VOLUMA XC INJECTION

## 2025-04-03 PROCEDURE — ? SKINVIVE INJECTION

## 2025-04-03 NOTE — PROCEDURE: SKINVIVE INJECTION
Map Statment: See Attach Map for Complete Details
Additional Area 3 Volume In Cc: 0
Filler: Skinvive
Detail Level: Detailed
Nasolabial Folds Filler Volume In Cc: 0.3
Use Map Statement For Sites (Optional): No
Lot #: [id number]
Consent: Written consent obtained. Risks include but not limited to bruising, beading, irregular texture, ulceration, infection, allergic reaction, scar formation, incomplete augmentation, temporary nature, procedural pain.
Vermilion Lips Filler Volume In Cc: 0.5
Post-Care Instructions: Patient instructed to apply ice to reduce swelling.
Expiration Date (Month Year): 11/11/2025
Marionette Lines Filler Volume In Cc: 0.2
Price (Use Numbers Only, No Special Characters Or $): 143
Procedural Text: The filler was administered to the treatment areas noted above.
Topical Anesthesia?: 15% lidocaine, 5% prilocaine, 0.25% phenenylephrine
Number Of Syringes (Required For Inventory): 1

## 2025-04-03 NOTE — PROCEDURE: BOTOX
Dilution (U/0.1 Cc): 5
Consent: Written consent obtained. Risks include but not limited to lid/brow ptosis, bruising, swelling, diplopia, temporary effect, incomplete chemical denervation.
Left Pupillary Line Units: 0
Show Levator Superior Units: Yes
Show Right And Left Brow Units: No
Periorbital Skin Units: 24
Additional Area 1 Units: 6
Incrementing Botox Units: By 0.5 Units
Reconstitution Date (Optional): 4/3/2025
Post-Care Instructions: Patient instructed to not lie down for 4 hours and limit physical activity for 24 hours.
Comments: Pt tolerated well,no issues. She will call for 3 mo Botox visit.
Detail Level: Detailed
Forehead Units: 10
Expiration Date (Month Year): 2/2027
Glabellar Complex Units: 15
Additional Area 1 Location: vermillion border
Lot #: [id number]
Price (Use Numbers Only, No Special Characters Or $): 622

## 2025-04-03 NOTE — PROCEDURE: JUVEDERM VOLUMA XC INJECTION
Filler: Juvederm Voluma XC
Jawline Filler Volume In Cc: 0
Lot #: [id number]
Post-Care Instructions: Patient instructed to apply ice to reduce swelling.
Include Cannula Brand?: DermaSculpt
Include Cannula Length?: 1.5 inch
Procedural Text: The filler was administered to the treatment areas noted above.
Topical Anesthesia?: 15% lidocaine, 5% prilocaine, 0.25% phenenylephrine
Number Of Syringes (Required For Inventory): 1
Expiration Date (Month Year): 6/1/2026
Price (Use Numbers Only, No Special Characters Or $): 112
Map Statment: See Attach Map for Complete Details
Detail Level: Detailed
Include Cannula Information In Note?: Yes
Consent: Written consent obtained. Risks include but not limited to bruising, beading, irregular texture, ulceration, infection, allergic reaction, scar formation, incomplete augmentation, temporary nature, procedural pain.
Include Cannula Size?: 27G

## 2025-04-16 ENCOUNTER — HOSPITAL ENCOUNTER (OUTPATIENT)
Dept: CARDIOLOGY | Facility: MEDICAL CENTER | Age: 60
End: 2025-04-16
Attending: FAMILY MEDICINE
Payer: COMMERCIAL

## 2025-04-16 DIAGNOSIS — G90.A POSTURAL ORTHOSTATIC TACHYCARDIA SYNDROME (POTS): ICD-10-CM

## 2025-04-16 PROCEDURE — 93660 TILT TABLE EVALUATION: CPT | Mod: TC

## 2025-04-16 NOTE — PROGRESS NOTES
Patient had PASSIVE tilt table exam today.    Patient NPO for exam, has  home.   Patient was NEGATIVE for passing out.     Consent signed.   Patient given verbal instructions/education regarding exam.   Patient had 20 mins of passive phase, followed by 5 mins of recovery.    Patient vitals: HR 59-86  Blood pressure 85//70    Patient had self-reported symptoms, see scanned hard chart.   After recovery phase, patient states that they are ready to go home.    Patient offered water.    Patient vitals returned to baseline.    Patient given verbal discharge instructions per protocol.      Patient ambulated self to [facility], escorted by RN, met by family member to drive patient home.    Patient of Dr. Farhan Tan MD, who was notified via phone message regarding EKG tracings and findings.  Dr. Yu, ADR notified of EKG tracings.   RN placed EKG tracings and patient documents in box to be read.

## 2025-05-08 ENCOUNTER — HOSPITAL ENCOUNTER (OUTPATIENT)
Dept: LAB | Facility: MEDICAL CENTER | Age: 60
End: 2025-05-08
Attending: INTERNAL MEDICINE
Payer: COMMERCIAL

## 2025-05-08 LAB
25(OH)D3 SERPL-MCNC: 44 NG/ML (ref 30–100)
ALBUMIN SERPL BCP-MCNC: 4.5 G/DL (ref 3.2–4.9)
ALBUMIN/GLOB SERPL: 2 G/DL
ALP SERPL-CCNC: 55 U/L (ref 30–99)
ALT SERPL-CCNC: 36 U/L (ref 2–50)
ANION GAP SERPL CALC-SCNC: 7 MMOL/L (ref 7–16)
AST SERPL-CCNC: 24 U/L (ref 12–45)
BILIRUB SERPL-MCNC: 0.3 MG/DL (ref 0.1–1.5)
BUN SERPL-MCNC: 21 MG/DL (ref 8–22)
CALCIUM ALBUM COR SERPL-MCNC: 9.1 MG/DL (ref 8.5–10.5)
CALCIUM SERPL-MCNC: 9.5 MG/DL (ref 8.5–10.5)
CHLORIDE SERPL-SCNC: 104 MMOL/L (ref 96–112)
CO2 SERPL-SCNC: 28 MMOL/L (ref 20–33)
CREAT SERPL-MCNC: 0.71 MG/DL (ref 0.5–1.4)
GFR SERPLBLD CREATININE-BSD FMLA CKD-EPI: 97 ML/MIN/1.73 M 2
GLOBULIN SER CALC-MCNC: 2.2 G/DL (ref 1.9–3.5)
GLUCOSE SERPL-MCNC: 91 MG/DL (ref 65–99)
POTASSIUM SERPL-SCNC: 4.2 MMOL/L (ref 3.6–5.5)
PROT SERPL-MCNC: 6.7 G/DL (ref 6–8.2)
SODIUM SERPL-SCNC: 139 MMOL/L (ref 135–145)
T3FREE SERPL-MCNC: 3.01 PG/ML (ref 2–4.4)
T4 FREE SERPL-MCNC: 1.32 NG/DL (ref 0.93–1.7)
TSH SERPL-ACNC: 1.49 UIU/ML (ref 0.38–5.33)

## 2025-05-08 PROCEDURE — 82306 VITAMIN D 25 HYDROXY: CPT

## 2025-05-08 PROCEDURE — 80053 COMPREHEN METABOLIC PANEL: CPT

## 2025-05-08 PROCEDURE — 36415 COLL VENOUS BLD VENIPUNCTURE: CPT

## 2025-05-08 PROCEDURE — 84439 ASSAY OF FREE THYROXINE: CPT

## 2025-05-08 PROCEDURE — 84445 ASSAY OF TSI GLOBULIN: CPT

## 2025-05-08 PROCEDURE — 83520 IMMUNOASSAY QUANT NOS NONAB: CPT

## 2025-05-08 PROCEDURE — 84481 FREE ASSAY (FT-3): CPT

## 2025-05-08 PROCEDURE — 84443 ASSAY THYROID STIM HORMONE: CPT

## 2025-05-10 LAB — TSH RECEP AB SER-ACNC: <1.1 IU/L

## 2025-05-11 LAB — TSI SER-ACNC: <0.1 IU/L

## 2025-05-22 ENCOUNTER — HOSPITAL ENCOUNTER (OUTPATIENT)
Facility: MEDICAL CENTER | Age: 60
End: 2025-05-22
Payer: COMMERCIAL

## 2025-05-22 LAB
ALBUMIN SERPL BCP-MCNC: 4.5 G/DL (ref 3.2–4.9)
ALP SERPL-CCNC: 54 U/L (ref 30–99)
ALT SERPL-CCNC: 43 U/L (ref 2–50)
AST SERPL-CCNC: 39 U/L (ref 12–45)
BILIRUB CONJ SERPL-MCNC: <0.2 MG/DL (ref 0.1–0.5)
BILIRUB INDIRECT SERPL-MCNC: NORMAL MG/DL (ref 0–1)
BILIRUB SERPL-MCNC: 0.4 MG/DL (ref 0.1–1.5)
GGT SERPL-CCNC: 17 U/L (ref 7–34)
PROT SERPL-MCNC: 6.6 G/DL (ref 6–8.2)

## 2025-05-22 PROCEDURE — 80076 HEPATIC FUNCTION PANEL: CPT

## 2025-05-22 PROCEDURE — 82977 ASSAY OF GGT: CPT

## 2025-05-22 PROCEDURE — 36415 COLL VENOUS BLD VENIPUNCTURE: CPT

## 2025-05-27 ENCOUNTER — HOSPITAL ENCOUNTER (OUTPATIENT)
Dept: RADIOLOGY | Facility: MEDICAL CENTER | Age: 60
End: 2025-05-27
Payer: COMMERCIAL

## 2025-05-27 DIAGNOSIS — R11.0 NAUSEA: ICD-10-CM

## 2025-05-27 DIAGNOSIS — R19.4 ALTERED BOWEL HABITS: ICD-10-CM

## 2025-05-27 DIAGNOSIS — R63.4 ABNORMAL LOSS OF WEIGHT: ICD-10-CM

## 2025-05-27 DIAGNOSIS — E27.1 ADDISON'S DISEASE (HCC): ICD-10-CM

## 2025-05-27 DIAGNOSIS — R93.2 ABNORMAL FINDINGS ON DIAGNOSTIC IMAGING OF GALL BLADDER: ICD-10-CM

## 2025-05-27 DIAGNOSIS — K76.89 HEPATIC CYST: ICD-10-CM

## 2025-05-27 DIAGNOSIS — R12 HEARTBURN: ICD-10-CM

## 2025-05-27 PROCEDURE — 74181 MRI ABDOMEN W/O CONTRAST: CPT

## 2025-06-19 ENCOUNTER — APPOINTMENT (OUTPATIENT)
Dept: URBAN - METROPOLITAN AREA CLINIC 20 | Facility: CLINIC | Age: 60
Setting detail: DERMATOLOGY
End: 2025-06-19

## 2025-06-19 DIAGNOSIS — D18.0 HEMANGIOMA: ICD-10-CM

## 2025-06-19 DIAGNOSIS — L72.0 EPIDERMAL CYST: ICD-10-CM

## 2025-06-19 PROBLEM — D18.01 HEMANGIOMA OF SKIN AND SUBCUTANEOUS TISSUE: Status: ACTIVE | Noted: 2025-06-19

## 2025-06-19 PROCEDURE — ? ELECTRODESICCATION (COSMETIC)

## 2025-06-19 PROCEDURE — ? COSMETIC EXTRACTIONS

## 2025-06-19 ASSESSMENT — LOCATION DETAILED DESCRIPTION DERM
LOCATION DETAILED: LEFT MEDIAL FOREHEAD
LOCATION DETAILED: LEFT FOREHEAD
LOCATION DETAILED: RIGHT SUPERIOR FOREHEAD

## 2025-06-19 ASSESSMENT — LOCATION SIMPLE DESCRIPTION DERM
LOCATION SIMPLE: RIGHT FOREHEAD
LOCATION SIMPLE: LEFT FOREHEAD

## 2025-06-19 ASSESSMENT — LOCATION ZONE DERM: LOCATION ZONE: FACE

## 2025-06-19 NOTE — PROCEDURE: ELECTRODESICCATION (COSMETIC)
Detail Level: Zone
Consent: The patient's consent was obtained including but not limited to risks of crusting, scabbing, blistering, scarring, darker or lighter pigmentary change, recurrence, incomplete removal and infection.
Powell: 0.5
Anesthesia Volume In Cc: 0
Post-Care Instructions: I reviewed with the patient in detail post-care instructions. Patient is to wear sunprotection, and avoid picking at any of the treated lesions. Pt may apply Vaseline to crusted or scabbing areas
Price (Use Numbers Only, No Special Characters Or $): 80

## 2025-06-19 NOTE — PROCEDURE: COSMETIC EXTRACTIONS
Detail Level: Detailed
Price (Use Numbers Only, No Special Characters Or $): 0
Post-Care Instructions: I reviewed with the patient in detail post-care instructions. Patient is to wear sunprotection, and avoid picking at any of the treated lesions.
Consent: The patient's consent was obtained including but not limited to risks of crusting, scabbing, blistering, scarring, darker or lighter pigmentary change, recurrence, incomplete removal and infection.
Render The Number Of Extractions: No

## 2025-07-18 ENCOUNTER — APPOINTMENT (OUTPATIENT)
Dept: URBAN - METROPOLITAN AREA CLINIC 15 | Facility: CLINIC | Age: 60
Setting detail: DERMATOLOGY
End: 2025-07-18

## 2025-07-18 DIAGNOSIS — E27 OTHER DISORDERS OF ADRENAL GLAND: ICD-10-CM

## 2025-07-18 DIAGNOSIS — Z71.89 OTHER SPECIFIED COUNSELING: ICD-10-CM

## 2025-07-18 DIAGNOSIS — L57.8 OTHER SKIN CHANGES DUE TO CHRONIC EXPOSURE TO NONIONIZING RADIATION: ICD-10-CM | Status: INADEQUATELY CONTROLLED

## 2025-07-18 DIAGNOSIS — L90.5 SCAR CONDITIONS AND FIBROSIS OF SKIN: ICD-10-CM

## 2025-07-18 DIAGNOSIS — L56.5 DISSEMINATED SUPERFICIAL ACTINIC POROKERATOSIS (DSAP): ICD-10-CM

## 2025-07-18 DIAGNOSIS — R23.3 SPONTANEOUS ECCHYMOSES: ICD-10-CM

## 2025-07-18 PROBLEM — E27.40 UNSPECIFIED ADRENOCORTICAL INSUFFICIENCY: Status: ACTIVE | Noted: 2025-07-18

## 2025-07-18 PROCEDURE — ? COUNSELING

## 2025-07-18 PROCEDURE — ? LIQUID NITROGEN

## 2025-07-18 PROCEDURE — ? ADDITIONAL NOTES

## 2025-07-18 PROCEDURE — ? PRESCRIPTION

## 2025-07-18 ASSESSMENT — LOCATION DETAILED DESCRIPTION DERM
LOCATION DETAILED: RIGHT RADIAL DORSAL HAND
LOCATION DETAILED: LEFT PROXIMAL DORSAL FOREARM
LOCATION DETAILED: RIGHT DISTAL PRETIBIAL REGION
LOCATION DETAILED: LEFT DISTAL PRETIBIAL REGION
LOCATION DETAILED: LEFT DISTAL PRETIBIAL REGION
LOCATION DETAILED: LEFT RADIAL DORSAL HAND

## 2025-07-18 ASSESSMENT — LOCATION ZONE DERM
LOCATION ZONE: LEG
LOCATION ZONE: HAND
LOCATION ZONE: LEG
LOCATION ZONE: ARM

## 2025-07-18 ASSESSMENT — LOCATION SIMPLE DESCRIPTION DERM
LOCATION SIMPLE: LEFT PRETIBIAL REGION
LOCATION SIMPLE: LEFT PRETIBIAL REGION
LOCATION SIMPLE: LEFT HAND
LOCATION SIMPLE: LEFT FOREARM
LOCATION SIMPLE: RIGHT HAND
LOCATION SIMPLE: RIGHT PRETIBIAL REGION

## 2025-08-02 ENCOUNTER — APPOINTMENT (OUTPATIENT)
Dept: LAB | Facility: MEDICAL CENTER | Age: 60
End: 2025-08-02
Payer: COMMERCIAL

## 2025-08-15 ENCOUNTER — APPOINTMENT (OUTPATIENT)
Dept: URBAN - METROPOLITAN AREA CLINIC 15 | Facility: CLINIC | Age: 60
Setting detail: DERMATOLOGY
End: 2025-08-15

## 2025-08-15 DIAGNOSIS — L20.89 OTHER ATOPIC DERMATITIS: ICD-10-CM | Status: INADEQUATELY CONTROLLED

## 2025-08-15 DIAGNOSIS — I83.9 ASYMPTOMATIC VARICOSE VEINS OF LOWER EXTREMITIES: ICD-10-CM | Status: INADEQUATELY CONTROLLED

## 2025-08-15 DIAGNOSIS — Z71.89 OTHER SPECIFIED COUNSELING: ICD-10-CM

## 2025-08-15 DIAGNOSIS — L57.8 OTHER SKIN CHANGES DUE TO CHRONIC EXPOSURE TO NONIONIZING RADIATION: ICD-10-CM | Status: INADEQUATELY CONTROLLED

## 2025-08-15 DIAGNOSIS — D22 MELANOCYTIC NEVI: ICD-10-CM

## 2025-08-15 DIAGNOSIS — L85.3 XEROSIS CUTIS: ICD-10-CM

## 2025-08-15 DIAGNOSIS — L56.5 DISSEMINATED SUPERFICIAL ACTINIC POROKERATOSIS (DSAP): ICD-10-CM

## 2025-08-15 DIAGNOSIS — L90.5 SCAR CONDITIONS AND FIBROSIS OF SKIN: ICD-10-CM

## 2025-08-15 PROBLEM — I83.90 ASYMPTOMATIC VARICOSE VEINS OF UNSPECIFIED LOWER EXTREMITY: Status: ACTIVE | Noted: 2025-08-15

## 2025-08-15 PROBLEM — D22.9 MELANOCYTIC NEVI, UNSPECIFIED: Status: ACTIVE | Noted: 2025-08-15

## 2025-08-15 PROCEDURE — ? COMPRESSION STOCKING

## 2025-08-15 PROCEDURE — ? ADDITIONAL NOTES

## 2025-08-15 PROCEDURE — ? COUNSELING

## 2025-08-15 PROCEDURE — ? LIQUID NITROGEN

## 2025-08-15 PROCEDURE — ? PRESCRIPTION

## 2025-08-15 RX ORDER — FLUOCINONIDE 0.5 MG/G
ENOUGH TO COVER OINTMENT TOPICAL
Qty: 60 | Refills: 12 | Status: ERX | COMMUNITY
Start: 2025-08-15

## 2025-08-15 RX ADMIN — FLUOCINONIDE ENOUGH TO COVER: 0.5 OINTMENT TOPICAL at 00:00

## 2025-08-15 ASSESSMENT — LOCATION DETAILED DESCRIPTION DERM
LOCATION DETAILED: LEFT DISTAL PRETIBIAL REGION
LOCATION DETAILED: LEFT DISTAL PRETIBIAL REGION
LOCATION DETAILED: RIGHT DISTAL PRETIBIAL REGION

## 2025-08-15 ASSESSMENT — LOCATION ZONE DERM
LOCATION ZONE: LEG
LOCATION ZONE: LEG

## 2025-08-15 ASSESSMENT — LOCATION SIMPLE DESCRIPTION DERM
LOCATION SIMPLE: RIGHT PRETIBIAL REGION
LOCATION SIMPLE: LEFT PRETIBIAL REGION
LOCATION SIMPLE: LEFT PRETIBIAL REGION

## 2025-08-26 ENCOUNTER — APPOINTMENT (OUTPATIENT)
Dept: RADIOLOGY | Facility: MEDICAL CENTER | Age: 60
End: 2025-08-26
Payer: COMMERCIAL

## 2025-09-26 ENCOUNTER — APPOINTMENT (OUTPATIENT)
Dept: RADIOLOGY | Facility: MEDICAL CENTER | Age: 60
End: 2025-09-26
Payer: COMMERCIAL